# Patient Record
Sex: FEMALE | Race: WHITE | Employment: FULL TIME | URBAN - METROPOLITAN AREA
[De-identification: names, ages, dates, MRNs, and addresses within clinical notes are randomized per-mention and may not be internally consistent; named-entity substitution may affect disease eponyms.]

---

## 2017-07-12 ENCOUNTER — OFFICE VISIT (OUTPATIENT)
Dept: URGENT CARE | Facility: URGENT CARE | Age: 27
End: 2017-07-12
Payer: COMMERCIAL

## 2017-07-12 VITALS
DIASTOLIC BLOOD PRESSURE: 80 MMHG | WEIGHT: 274 LBS | BODY MASS INDEX: 43 KG/M2 | HEIGHT: 67 IN | SYSTOLIC BLOOD PRESSURE: 120 MMHG | TEMPERATURE: 99.3 F | HEART RATE: 72 BPM | OXYGEN SATURATION: 98 %

## 2017-07-12 DIAGNOSIS — M54.50 LEFT-SIDED LOW BACK PAIN WITHOUT SCIATICA, UNSPECIFIED CHRONICITY: ICD-10-CM

## 2017-07-12 DIAGNOSIS — R10.9 FLANK PAIN: Primary | ICD-10-CM

## 2017-07-12 LAB
ALBUMIN UR-MCNC: NEGATIVE MG/DL
APPEARANCE UR: ABNORMAL
BACTERIA #/AREA URNS HPF: ABNORMAL /HPF
BILIRUB UR QL STRIP: NEGATIVE
COLOR UR AUTO: YELLOW
GLUCOSE UR STRIP-MCNC: NEGATIVE MG/DL
HGB UR QL STRIP: ABNORMAL
KETONES UR STRIP-MCNC: NEGATIVE MG/DL
LEUKOCYTE ESTERASE UR QL STRIP: NEGATIVE
NITRATE UR QL: NEGATIVE
NON-SQ EPI CELLS #/AREA URNS LPF: ABNORMAL /LPF
PH UR STRIP: 5.5 PH (ref 5–7)
RBC #/AREA URNS AUTO: ABNORMAL /HPF (ref 0–2)
SP GR UR STRIP: >1.03 (ref 1–1.03)
URN SPEC COLLECT METH UR: ABNORMAL
UROBILINOGEN UR STRIP-ACNC: 0.2 EU/DL (ref 0.2–1)
WBC #/AREA URNS AUTO: ABNORMAL /HPF (ref 0–2)

## 2017-07-12 PROCEDURE — 81001 URINALYSIS AUTO W/SCOPE: CPT | Performed by: FAMILY MEDICINE

## 2017-07-12 PROCEDURE — 99213 OFFICE O/P EST LOW 20 MIN: CPT | Performed by: FAMILY MEDICINE

## 2017-07-12 PROCEDURE — 87086 URINE CULTURE/COLONY COUNT: CPT | Performed by: FAMILY MEDICINE

## 2017-07-12 NOTE — MR AVS SNAPSHOT
"              After Visit Summary   2017    Ophelia Stratton    MRN: [id number]           Patient Information     Date Of Birth          1990        Visit Information        Provider Department      2017 7:35 PM Shell Lord MD [facility]        Today's Diagnoses     Flank pain    -  1    Left-sided low back pain without sciatica, unspecified chronicity           Follow-ups after your visit        Who to contact     If you have questions or need follow up information about today's clinic visit or your schedule please contact [facility] directly at [phone].  Normal or non-critical lab and imaging results will be communicated to you by Soapbox Mobilehart, letter or phone within 4 business days after the clinic has received the results. If you do not hear from us within 7 days, please contact the clinic through Soapbox Mobilehart or phone. If you have a critical or abnormal lab result, we will notify you by phone as soon as possible.  Submit refill requests through [email or web address] or call your pharmacy and they will forward the refill request to us. Please allow 3 business days for your refill to be completed.          Additional Information About Your Visit        MyChart Information     [email or web address] lets you send messages to your doctor, view your test results, renew your prescriptions, schedule appointments and more. To sign up, go to [email or web address] . Click on \"Log in\" on the left side of the screen, which will take you to the Welcome page. Then click on \"Sign up Now\" on the right side of the page.     You will be asked to enter the access code listed below, as well as some personal information. Please follow the directions to create your username and password.     Your access code is: [email or web address]  Expires: 10/10/2017 11:34 PM     Your access code will  in 90 days. If you need help or a new code, please call your [facility] or [phone].        Care EveryWhere ID  " "   This is your Care EveryWhere ID. This could be used by other organizations to access your [facility] medical records  [id number]        Your Vitals Were     Pulse Temperature Height Pulse Oximetry BMI (Body Mass Index)       72 99.3  F (37.4  C) (Oral) 5' 7\" (1.702 m) 98% 42.91 kg/m2        Blood Pressure from Last 3 Encounters:   07/12/17 120/80   03/20/16 135/87    Weight from Last 3 Encounters:   07/12/17 274 lb (124.3 kg)   03/20/16 293 lb (132.9 kg)              We Performed the Following     UA with Microscopic reflex to Culture     Urine Culture Aerobic Bacterial        Primary Care Provider    None Specified       No primary provider on file.        Equal Access to Services     VANESSA CANALES : Cyndi Lockett, zulema mckeon, amelie beckford, pamela alexandra . So [facility] [phone].    ATENCIÓN: Si habla español, tiene a kimble disposición servicios gratuitos de asistencia lingüística. Llame al [phone].    We comply with applicable federal civil rights laws and [place] laws. We do not discriminate on the basis of race, color, national origin, age, disability sex, sexual orientation or gender identity.            Thank you!     [facility] you for [facility]  [facility]. Our goal is always to provide you with excellent care. Hearing back from our patients is one way we can continue to improve our services. Please take a few minutes to complete the written survey that you may receive in the mail after your visit with us. Thank you!             Your Updated Medication List - Protect others around you: Learn how to safely use, store and throw away your medicines at www.disposemymeds.org.          This list is accurate as of: 7/12/17 11:34 PM.  Always use your most recent med list.                   Brand Name Dispense Instructions for use Diagnosis    amphetamine-dextroamphetamine 30 MG per tablet    ADDERALL     One tablet first thing in " the morning, one 4-5 hours later.        TYLENOL PO      Take 500 mg by mouth every 8 hours as needed for mild pain or fever

## 2017-07-12 NOTE — LETTER
[facility]  [address] [place] [address]    July 14, 2017    Ophelia Stratton  [address]  [place] [address]          Dear Vanessa Jacinto is a copy of your results. The urine culture is negative, please follow up if symptoms persist.    Results for orders placed or performed in visit on 07/12/17   UA with Microscopic reflex to Culture   Result Value Ref Range    Color Urine Yellow     Appearance Urine Slightly Cloudy     Glucose Urine Negative NEG mg/dL    Bilirubin Urine Negative NEG    Ketones Urine Negative NEG mg/dL    Specific Gravity Urine >1.030 1.003 - 1.035    pH Urine 5.5 5.0 - 7.0 pH    Protein Albumin Urine Negative NEG mg/dL    Urobilinogen Urine 0.2 0.2 - 1.0 EU/dL    Nitrite Urine Negative NEG    Blood Urine Moderate (A) NEG    Leukocyte Esterase Urine Negative NEG    Source Midstream Urine     WBC Urine 2-5 (A) 0 - 2 /HPF    RBC Urine O - 2 0 - 2 /HPF    Squamous Epithelial /LPF Urine Many (A) FEW /LPF    Bacteria Urine Many (A) NEG /HPF   Urine Culture Aerobic Bacterial   Result Value Ref Range    Specimen Description Midstream Urine     Culture Micro       10,000 to 50,000 colonies/mL mixed urogenital angelo Susceptibility testing not   routinely done      Micro Report Status FINAL 07/13/2017        If you have any questions or concerns, please call myself or my nurse at [phone].      Sincerely,        hSell Lord MD/susie

## 2017-07-13 LAB
BACTERIA SPEC CULT: NORMAL
MICRO REPORT STATUS: NORMAL
SPECIMEN SOURCE: NORMAL

## 2017-07-13 NOTE — NURSING NOTE
".  Chief Complaint   Patient presents with     Back Pain     left side       Initial /80  Pulse 72  Temp 99.3  F (37.4  C) (Oral)  Ht 5' 7\" (1.702 m)  Wt 274 lb (124.3 kg)  SpO2 98%  BMI 42.91 kg/m2 Estimated body mass index is 42.91 kg/(m^2) as calculated from the following:    Height as of this encounter: 5' 7\" (1.702 m).    Weight as of this encounter: 274 lb (124.3 kg).  Medication Reconciliation: complete   Cielo Roman CMA    "

## 2017-07-13 NOTE — PROGRESS NOTES
SUBJECTIVE:                                                    Ophelia Stratton is a 27 year old female who presents to clinic today for the following health issues:      Back Pain       Duration: 3 weeks        Specific cause: no injury    Description:   Location of pain: low back left  Character of pain: sharp and constant  Pain radiation:goes to side  New numbness or weakness in legs, not attributed to pain:  no     Intensity: Currently 6/10    History:   Pain interferes with job: YES,   History of back problems: when pregnant  Denies any possibility of pregnancy declined a pregnancy test  Any previous MRI or X-rays: Yes--at [place].  Date   Sees a specialist for back pain:  No  Therapies tried without relief: NSAIDs     Alleviating factors:   Improved by: none      Precipitating factors:  Worsened by: bending     Functional and Psychosocial Screen (Ba STarT Back):        Left sided back pain    It's been three weeks    Woke up with it one day was sore  Wasn't sure if it was just something she did at work.  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  But no injuries    Continued to be the same   Seems to be worse when she wakes in the morning and also at night would be pretty bad  Constant during the day feels like a bruise throughout the day.    Just taking tylenol and occasional ibuprofen    Warm baths seem to help    History of trauma: none     No thoughts of harming self or others     Problem list, Medication list, Allergies, and Medical/Social/Surgical histories reviewed in EPIC and updated as appropriate.        REVIEW OF SYSTEMS  General: negative for fever, constitutional symptoms or weight loss  Resp: negative for chest pain or shortness of breath  CV: negative for chest pain  : negative for dysuria , incontinence, frequency  Musculoskeletal: as above  Neurologic: negative for ataxia, saddle anesthesia, fecal or urinary incontinence, one sided weakness,  Paresthesias  Constitutional,  "HEENT, cardiovascular, pulmonary, gi and gu systems are negative, except as otherwise noted.    Physical Exam:  Vitals: /80  Pulse 72  Temp 99.3  F (37.4  C) (Oral)  Ht 5' 7\" (1.702 m)  Wt 274 lb (124.3 kg)  SpO2 98%  BMI 42.91 kg/m2  BMI= Body mass index is 42.91 kg/(m^2).  Constitutional: healthy, alert and no acute distress   Head: atraumatic  CARDIO: regular in rate and rhythm no murmurs rubs or gallops  RESP: lungs clear to auscultation  ABDOMEN: soft nontender  NEURO: Patellar reflexes intact and equal b/l  BACK:  Straight leg raise intact, No spine tenderness  Positive left lumbar  paraspinal muscle tenderness to palpation, strength intact and equal b/l lower extremities. Sensory intact. Rectal exam declined/deferred.   GAIT: intact  Psychiatric: mentation appears normal and affect normal/bright    Diagnostic Test Results:  Results for orders placed or performed in visit on 07/12/17 (from the past 24 hour(s))   UA with Microscopic reflex to Culture   Result Value Ref Range    Color Urine Yellow     Appearance Urine Slightly Cloudy     Glucose Urine Negative NEG mg/dL    Bilirubin Urine Negative NEG    Ketones Urine Negative NEG mg/dL    Specific Gravity Urine >1.030 1.003 - 1.035    pH Urine 5.5 5.0 - 7.0 pH    Protein Albumin Urine Negative NEG mg/dL    Urobilinogen Urine 0.2 0.2 - 1.0 EU/dL    Nitrite Urine Negative NEG    Blood Urine Moderate (A) NEG    Leukocyte Esterase Urine Negative NEG    Source Midstream Urine     WBC Urine 2-5 (A) 0 - 2 /HPF    RBC Urine O - 2 0 - 2 /HPF    Squamous Epithelial /LPF Urine Many (A) FEW /LPF    Bacteria Urine Many (A) NEG /HPF         Impression:  No diagnosis found.      ICD-10-CM    1. Flank pain R10.9 UA with Microscopic reflex to Culture     Urine Culture Aerobic Bacterial   2. Left-sided low back pain without sciatica, unspecified chronicity M54.5        Plan:  Patient was mainly worried about an infection. Urinalysis is negative and appears normal " however will send for culture if positive will treat with ciprofloxacin. Potential Side effects discussed. Aware of the risks of increased tendon rupture with fluoroquinolones, especially if used with steroids.  Back pain based on exam and history appears consistent with musculoskeletal pain. Patient has had history of these flare ups in the past. Knows of some stretches she can do  Recommend primary care provider follow up if symptoms persist  Alarm signs or symptoms discussed, if present recommend go to ER   Instructions for back care and return precautions discussed.    back pain stretching excercises discussed. supportive treatment.  considery physical therapy if not better despite supportive treatment.  activity modifications advised.  Over the counter medications discussed. Patient aware to avoid NSAIDS if with any kidney disease or ulcers. Proper dosing of over the counter medications likewise discussed.  Adverse reaction to medication discussed.  aware to come in right away if with any fever or chills, worsening symptoms, headache, bowel or bladder incontinence, motor or sensory deficits or gait disturbances.   follow-up recommended.      Shell Lord MD

## 2018-02-22 ENCOUNTER — TELEPHONE (OUTPATIENT)
Dept: FAMILY MEDICINE | Facility: CLINIC | Age: 28
End: 2018-02-22

## 2018-02-22 NOTE — TELEPHONE ENCOUNTER
Patient is calling, not an established patient yet and would like to come in see provider for ADHD. Please call to discuss. Thank you.

## 2018-02-23 NOTE — TELEPHONE ENCOUNTER
Left message to call me back directly. If patient has had testing done will need to bring those with her to the appointment. If no testing has been done patient will need to have that done at [facility] or somewhere else./Denisse Owens,

## 2018-02-26 NOTE — TELEPHONE ENCOUNTER
Spoke with patient advised ADHD records needed to be treated by provider. Told to fill out an NEAL to have records transferred.   BIJAN Lynne

## 2019-10-01 ENCOUNTER — TELEPHONE (OUTPATIENT)
Dept: PSYCHOLOGY | Facility: CLINIC | Age: 29
End: 2019-10-01

## 2019-10-02 NOTE — TELEPHONE ENCOUNTER
She wants to establish care with a dr at [facility], she has been out of adderall for 2 days, her old clinic does not take [facility]are. Please call her back.

## 2019-10-03 NOTE — TELEPHONE ENCOUNTER
I spoke to the patient and I scheduled an appointment for her on 10/11/19.  She will bring her ADHD records to her visit or possibly fax them to us before her appointment.  Caroline Solis,

## 2019-10-29 NOTE — PROGRESS NOTES
"Subjective     Ophelia Stratton is a 29 year old female who presents to clinic today for the following health issues:    HPI   New Patient/Transfer of Care  ADHD  No changes since last ADHD check   [facility] pap [facility]     Pt new to [facility]. Was previously at [facility]  Off of adderral for a month and finding it difficult as she is back to school at this time  She has had no trouble with it.  No weight loss, BP good and no tics  She is agreeable to signing controlled substance agreement, she has done that in the past    Pt due for pap, she will schedule a CPE in the next few months.     Reviewed and updated as needed this visit by Provider         Review of Systems   ROS COMP: Constitutional, HEENT, cardiovascular, pulmonary, gi and gu systems are negative, except as otherwise noted.      Objective    /86   Pulse 97   Temp 98.8  F (37.1  C) (Oral)   Resp 18   Ht 1.715 m (5' 7.5\")   Wt 122.9 kg (271 lb)   LMP 10/09/2019 (Exact Date)   BMI 41.82 kg/m    Body mass index is 41.82 kg/m .  Physical Exam   GENERAL: healthy, alert and no distress  RESP: lungs clear to auscultation - no rales, rhonchi or wheezes  CV: regular rate and rhythm, normal S1 S2, no S3 or S4, no murmur, click or rub, no peripheral edema and peripheral pulses strong  MS: no gross musculoskeletal defects noted, no edema    Diagnostic Test Results:  Labs reviewed in Epic        Assessment & Plan     1. Attention deficit hyperactivity disorder (ADHD), unspecified ADHD type  Follow-up in 6 months, sooner with concern, controlled substance agreement signed  - amphetamine-dextroamphetamine (ADDERALL) 30 MG tablet; Take 1 tablet (30 mg) by mouth 2 times daily One tablet first thing in the morning, one 4-5 hours later.  Dispense: 60 tablet; Refill: 0     Tobacco Cessation:   reports that she has been smoking. She has been smoking about 0.00 packs per day. She has never used smokeless tobacco.  Tobacco Cessation " "Action Plan: Information offered: Patient not interested at this time      BMI:   Estimated body mass index is 41.82 kg/m  as calculated from the following:    Height as of this encounter: 1.715 m (5' 7.5\").    Weight as of this encounter: 122.9 kg (271 lb).   Weight management plan: Discussed healthy diet and exercise guidelines            No follow-ups on file.    Katharine Cali MD  [facility]    "

## 2019-10-30 ENCOUNTER — OFFICE VISIT (OUTPATIENT)
Dept: FAMILY MEDICINE | Facility: CLINIC | Age: 29
End: 2019-10-30
Payer: COMMERCIAL

## 2019-10-30 VITALS
RESPIRATION RATE: 18 BRPM | BODY MASS INDEX: 41.07 KG/M2 | HEIGHT: 68 IN | DIASTOLIC BLOOD PRESSURE: 86 MMHG | HEART RATE: 97 BPM | SYSTOLIC BLOOD PRESSURE: 138 MMHG | TEMPERATURE: 98.8 F | WEIGHT: 271 LBS

## 2019-10-30 DIAGNOSIS — F90.9 ATTENTION DEFICIT HYPERACTIVITY DISORDER (ADHD), UNSPECIFIED ADHD TYPE: Primary | ICD-10-CM

## 2019-10-30 DIAGNOSIS — E66.01 MORBID OBESITY (H): ICD-10-CM

## 2019-10-30 PROCEDURE — 99203 OFFICE O/P NEW LOW 30 MIN: CPT | Performed by: FAMILY MEDICINE

## 2019-10-30 RX ORDER — DEXTROAMPHETAMINE SACCHARATE, AMPHETAMINE ASPARTATE, DEXTROAMPHETAMINE SULFATE AND AMPHETAMINE SULFATE 7.5; 7.5; 7.5; 7.5 MG/1; MG/1; MG/1; MG/1
30 TABLET ORAL 2 TIMES DAILY
Qty: 60 TABLET | Refills: 0 | Status: SHIPPED | OUTPATIENT
Start: 2019-10-30 | End: 2019-11-25

## 2019-10-30 ASSESSMENT — MIFFLIN-ST. JEOR: SCORE: 1999.81

## 2019-10-30 NOTE — LETTER
[facility]  10/30/19    Patient: Ophelia Stratton  YOB: 1990  Medical Record Number: [id number]  CSN: [id number]                                                                              Non-opioid Controlled Substance Agreement    I understand that my care provider has prescribed a controlled substance to help manage my condition(s). I am taking this medicine to help me function or work. I know this is strong medicine, and that it can cause serious side effects. Controlled substances can be sedating, addicting and may cause a dependency on the drug. They can affect my ability to drive or think, and cause depression. They need to be taken exactly as prescribed. Combining controlled substances with certain medicines or chemicals (such as cocaine, sedatives and tranquilizers, sleeping pills, meth) can be dangerous or even fatal. Also, if I stop controlled substances suddenly, I may have severe withdrawal symptoms.  If not helpful, I may be asked to stop them.    The risks, benefits, and side effects of these medicine(s) were explained to me. I agree that:    1. I will take part in other treatments as advised by my care team. This may be psychiatry or counseling, physical therapy, behavioral therapy, group treatment or a referral to a pain clinic. I will reduce or stop my medicine when my care team tells me to do so.  2. I will take my medicines as prescribed. I will not change the dose or schedule unless my care team tells me to. There will be no refills if I  run out early.   I may be contactedwithout warning and asked to complete a urine drug test or pill count at any time.   3. I will keep all my appointments, and understand this is part of the monitoring of controlled substances. My care team may require an office visit for EVERY controlled substance refill. If I miss appointments or don t follow instructions, my care team may stop my medicine.  4. I will not ask other providers to  prescribe controlled substances, and I will not accept controlled substances from other people. If I need another prescribed controlled substance for a new reason, I will tell my care team within 1 business day.  5. I will use one pharmacy to fill all of my controlled substance prescriptions, and it is up to me to make sure that I do not run out of my medicines on weekends or holidays. If my care team is willing to refill my controlled substance prescription without a visit, I must request refills only during office hours, refills may take up to 3 days to process, and it may take up to 5 to 7 days for my medicine to be mailed and ready at my pharmacy. Prescriptions will not be mailed anywhere except my pharmacy.    6. I am responsible for my prescriptions. If the medicine/prescription is lost or stolen, it will not be replaced. I also agree not to share controlled substance medicines with anyone.              [facility]  10/30/19  Patient:  Ophelia Stratton  YOB: 1990  Medical Record Number: [id number]  [facility]: [id number]    7. I agree to not use ANY illegal or recreational drugs. This includes marijuana, cocaine, bath salts or other drugs. I agree not to use alcohol unless my care team says I may. I agree to give urine samples whenever asked. If I don t give a urine sample, the care team may stop my medicine.    8. If I enroll in the [place] Medical Marijuana program, I will tell my care team. I will also sign an agreement to share my medical records with my care team.    9. I will bring in my list of medicines (or my medicine bottles) each time I come to the clinic.   10. I will tell my care team right away if I become pregnant or have a new medical problem treated outside of my regular clinic.  11. I understand that this medicine can affect my thinking and judgment. It may be unsafe for me to drive, use machinery and do dangerous tasks. I will not do any of these things until I know how  the medicine affects me. If my dose changes, I will wait to see how it affects me. I will contact my care team if I have concerns about medicine side effects.    I understand that if I do not follow any of the conditions above, my prescriptions or treatment may be stopped.      I agree that my provider, clinic care team, and pharmacy may work with any city, state or federal law enforcement agency that investigates the misuse, sale, or other diversion of my controlled medicine. I will allow my provider to discuss my care with or share a copy of this agreement with any other treating provider, pharmacy or emergency room where I receive care. I agree to give up (waive) any right of privacy or confidentiality with respect to these consents.   I have read this agreement and have asked questions about anything I did not understand.    ____________________________________________________    ____________  ________  Patient signature                                                         Date      Time    ____________________________________________________     ____________  ________  Witness                                                          Date      Time    ____________________________________________________  Provider signature

## 2019-10-30 NOTE — NURSING NOTE
"Chief Complaint   Patient presents with     A.D.H.D       Initial /86   Pulse 97   Temp 98.8  F (37.1  C) (Oral)   Resp 18   Ht 1.715 m (5' 7.5\")   Wt 122.9 kg (271 lb)   BMI 41.82 kg/m   Estimated body mass index is 41.82 kg/m  as calculated from the following:    Height as of this encounter: 1.715 m (5' 7.5\").    Weight as of this encounter: 122.9 kg (271 lb).    Cielo Roman CMA    "

## 2019-11-25 DIAGNOSIS — F90.9 ATTENTION DEFICIT HYPERACTIVITY DISORDER (ADHD), UNSPECIFIED ADHD TYPE: ICD-10-CM

## 2019-11-25 RX ORDER — DEXTROAMPHETAMINE SACCHARATE, AMPHETAMINE ASPARTATE, DEXTROAMPHETAMINE SULFATE AND AMPHETAMINE SULFATE 7.5; 7.5; 7.5; 7.5 MG/1; MG/1; MG/1; MG/1
30 TABLET ORAL 2 TIMES DAILY
Qty: 60 TABLET | Refills: 0 | Status: SHIPPED | OUTPATIENT
Start: 2019-11-25 | End: 2019-12-19

## 2019-11-25 NOTE — TELEPHONE ENCOUNTER
Reason for call:  Medication   If this is a refill request, has the caller requested the refill from the pharmacy already? N/A  Will the patient be using a [facility]? N/A  Name of the pharmacy and phone number for the current request: on file    Name of the medication requested: adderall    Other request: Would like it refill asap!    Phone number to reach patient:  Cell number on file:    Telephone Information:   Mobile [phone]       Best Time:  any    Can we leave a detailed message on this number?  YES

## 2019-11-25 NOTE — TELEPHONE ENCOUNTER
Controlled Substance Refill Request for adderall 30mg bid  Last refill: 10/30/19 x1 month  OV note from 10/30 said follow up in 6 months.  Problem List Complete:  Yes  Overview Signed 10/30/2019  3:53 PM by Katharine Portillo MD   Patient is followed by KATHARINE PORTILLO for ongoing prescription of stimulants.  All refills should be approved by this provider, or covering partner.     Medication(s): adderall 30 bid.   Maximum quantity per month: 60  Clinic visit frequency required: Q 6  months      Controlled substance agreement on file: Yes       Date(s): 10/30/19  Neuropsych evaluation for ADD completed:  No           checked in past 3 months?  RN unable to access  at this time.  Rosibel Parra RN

## 2019-11-27 NOTE — TELEPHONE ENCOUNTER
I spoke to the patient and let her know that her refill was sent electronically to the pharmacy.  Caroline Solis,

## 2019-12-18 DIAGNOSIS — F90.9 ATTENTION DEFICIT HYPERACTIVITY DISORDER (ADHD), UNSPECIFIED ADHD TYPE: ICD-10-CM

## 2019-12-19 RX ORDER — DEXTROAMPHETAMINE SACCHARATE, AMPHETAMINE ASPARTATE, DEXTROAMPHETAMINE SULFATE AND AMPHETAMINE SULFATE 7.5; 7.5; 7.5; 7.5 MG/1; MG/1; MG/1; MG/1
30 TABLET ORAL 2 TIMES DAILY
Qty: 60 TABLET | Refills: 0 | Status: SHIPPED | OUTPATIENT
Start: 2019-12-19 | End: 2020-01-20

## 2019-12-19 NOTE — TELEPHONE ENCOUNTER
Reason for call:  Medication   If this is a refill request, has the caller requested the refill from the pharmacy already? N/A  Will the patient be using a [facility]? N/A  Name of the pharmacy and phone number for the current request: Adderall refill    Name of the medication requested: Adderall refill    Other request: wanted to call ahead to rigoberto a refill     Phone number to reach patient:  Cell number on file:    Telephone Information:   Mobile [phone]       Best Time:  Any      Can we leave a detailed message on this number?  YES

## 2019-12-19 NOTE — TELEPHONE ENCOUNTER
Controlled Substance Refill Request for Adderall 30mg  Problem List Complete:  Yes    Patient is followed by SHAYY BALTAZAR for ongoing prescription of stimulants.  All refills should be approved by this provider, or covering partner.     Medication(s): adderall 30 bid.   Maximum quantity per month: 60  Clinic visit frequency required: Q 6  months      Controlled substance agreement on file: Yes       Date(s): 10/30/19  Neuropsych evaluation for ADD completed:  No       checked in past 3 months?  Yes 12/18/19, last filled 11/26/19       Shireen SOLN, RN, CPN

## 2020-01-20 ENCOUNTER — TELEPHONE (OUTPATIENT)
Dept: FAMILY MEDICINE | Facility: CLINIC | Age: 30
End: 2020-01-20

## 2020-01-20 DIAGNOSIS — F90.9 ATTENTION DEFICIT HYPERACTIVITY DISORDER (ADHD), UNSPECIFIED ADHD TYPE: ICD-10-CM

## 2020-01-20 RX ORDER — DEXTROAMPHETAMINE SACCHARATE, AMPHETAMINE ASPARTATE, DEXTROAMPHETAMINE SULFATE AND AMPHETAMINE SULFATE 7.5; 7.5; 7.5; 7.5 MG/1; MG/1; MG/1; MG/1
30 TABLET ORAL 2 TIMES DAILY
Qty: 60 TABLET | Refills: 0 | Status: SHIPPED | OUTPATIENT
Start: 2020-01-20 | End: 2020-02-21

## 2020-01-20 NOTE — TELEPHONE ENCOUNTER
Reason for call:  Other   Patient called regarding (reason for call): call back  Additional comments: an reminder for prescription refill for adderall.    Phone number to reach patient:  Cell number on file:    Telephone Information:   Mobile [phone]       Best Time:  anytime    Can we leave a detailed message on this number?  YES

## 2020-01-20 NOTE — TELEPHONE ENCOUNTER
Controlled Substance Refill Request for Adderall 30mg  Problem List Complete:  Yes     Patient is followed by SHAYY BALTAZAR for ongoing prescription of stimulants.  All refills should be approved by this provider, or covering partner.     Medication(s): adderall 30 bid.   Maximum quantity per month: 60  Clinic visit frequency required: Q 6  months      Controlled substance agreement on file: Yes       Date(s): 10/30/19  Neuropsych evaluation for ADD completed:  No         checked in past 3 months?  Yes 12/18/19    Asiya SOLN, RN

## 2020-02-20 ENCOUNTER — TELEPHONE (OUTPATIENT)
Dept: FAMILY MEDICINE | Facility: CLINIC | Age: 30
End: 2020-02-20

## 2020-02-20 DIAGNOSIS — F90.9 ATTENTION DEFICIT HYPERACTIVITY DISORDER (ADHD), UNSPECIFIED ADHD TYPE: ICD-10-CM

## 2020-02-20 NOTE — TELEPHONE ENCOUNTER
Reason for call:  Medication   If this is a refill request, has the caller requested the refill from the pharmacy already? No  Will the patient be using a [facility]? No  Name of the pharmacy and phone number for the current request: [facility] [phone]    Name of the medication requested: amphetamine-dextroamphetamine (ADDERALL) 30 MG tablet    Other request: N/A    Phone number to reach patient:  Cell number on file:    Telephone Information:   Mobile [phone]       Best Time:  Anytime    Can we leave a detailed message on this number?  YES    Travel screening: Not Applicable

## 2020-02-21 RX ORDER — DEXTROAMPHETAMINE SACCHARATE, AMPHETAMINE ASPARTATE, DEXTROAMPHETAMINE SULFATE AND AMPHETAMINE SULFATE 7.5; 7.5; 7.5; 7.5 MG/1; MG/1; MG/1; MG/1
30 TABLET ORAL 2 TIMES DAILY
Qty: 60 TABLET | Refills: 0 | Status: SHIPPED | OUTPATIENT
Start: 2020-02-21 | End: 2020-03-20

## 2020-03-20 ENCOUNTER — TELEPHONE (OUTPATIENT)
Dept: FAMILY MEDICINE | Facility: CLINIC | Age: 30
End: 2020-03-20

## 2020-03-20 DIAGNOSIS — F90.9 ATTENTION DEFICIT HYPERACTIVITY DISORDER (ADHD), UNSPECIFIED ADHD TYPE: ICD-10-CM

## 2020-03-20 RX ORDER — DEXTROAMPHETAMINE SACCHARATE, AMPHETAMINE ASPARTATE, DEXTROAMPHETAMINE SULFATE AND AMPHETAMINE SULFATE 7.5; 7.5; 7.5; 7.5 MG/1; MG/1; MG/1; MG/1
30 TABLET ORAL 2 TIMES DAILY
Qty: 60 TABLET | Refills: 0 | Status: SHIPPED | OUTPATIENT
Start: 2020-03-20 | End: 2020-04-17

## 2020-03-20 NOTE — TELEPHONE ENCOUNTER
Controlled Substance Refill Request for Adderall  Problem List Complete:  Yes    Patient is followed by SHAYY BALTAZAR for ongoing prescription of stimulants.  All refills should be approved by this provider, or covering partner.     Medication(s): adderall 30 bid.   Maximum quantity per month: 60  Clinic visit frequency required: Q 6  months      Controlled substance agreement on file: Yes       Date(s): 10/30/19  Neuropsych evaluation for ADD completed:  No       checked in past 3 months?  Yes 3/20/20, unable to retrieve date from        Shireen SOLN, RN, CPN

## 2020-03-20 NOTE — TELEPHONE ENCOUNTER
Reason for call:  Per patient: Needs a refill on adderall medication    Phone number to reach patient:  Cell number on file:    Telephone Information:   Mobile [phone]       Best Time:  Anytime    Can we leave a detailed message on this number?  YES    Travel screening: Negative

## 2020-04-17 ENCOUNTER — TELEPHONE (OUTPATIENT)
Dept: FAMILY MEDICINE | Facility: CLINIC | Age: 30
End: 2020-04-17

## 2020-04-17 DIAGNOSIS — F90.9 ATTENTION DEFICIT HYPERACTIVITY DISORDER (ADHD), UNSPECIFIED ADHD TYPE: ICD-10-CM

## 2020-04-17 RX ORDER — DEXTROAMPHETAMINE SACCHARATE, AMPHETAMINE ASPARTATE, DEXTROAMPHETAMINE SULFATE AND AMPHETAMINE SULFATE 7.5; 7.5; 7.5; 7.5 MG/1; MG/1; MG/1; MG/1
30 TABLET ORAL 2 TIMES DAILY
Qty: 60 TABLET | Refills: 0 | Status: SHIPPED | OUTPATIENT
Start: 2020-04-17 | End: 2020-05-13

## 2020-04-17 NOTE — TELEPHONE ENCOUNTER
Reason for call:  Medication   If this is a refill request, has the caller requested the refill from the pharmacy already? Yes  Will the patient be using a [facility]? No  Name of the pharmacy and phone number for the current request: [facility]/pharmacy #[id number] - [place]  [phone] (Phone  [phone] (Fax)    Name of the medication requested: ASSERALL 30 mg    Other request: patient is leaving the state and would like the refill as soon as possible     Phone number to reach patient:  Cell number on file:    Telephone Information:   Mobile [phone]       Best Time:  anytime    Can we leave a detailed message on this number?  YES    Travel screening: Not Applicable

## 2020-04-17 NOTE — TELEPHONE ENCOUNTER
Controlled Substance Refill Request for Adderall  Problem List Complete:  Yes     Patient is followed by SHAYY BALTAZAR for ongoing prescription of stimulants.  All refills should be approved by this provider, or covering partner.     Medication(s): adderall 30 bid.   Maximum quantity per month: 60  Clinic visit frequency required: Q 6  months      Controlled substance agreement on file: Yes       Date(s): 10/30/19  Neuropsych evaluation for ADD completed:  No         checked in past 3 months?  Yes 3/20/20,  Cielo SOLN, RN

## 2020-05-12 ENCOUNTER — TELEPHONE (OUTPATIENT)
Dept: FAMILY MEDICINE | Facility: CLINIC | Age: 30
End: 2020-05-12

## 2020-05-12 DIAGNOSIS — F90.9 ATTENTION DEFICIT HYPERACTIVITY DISORDER (ADHD), UNSPECIFIED ADHD TYPE: ICD-10-CM

## 2020-05-12 RX ORDER — DEXTROAMPHETAMINE SACCHARATE, AMPHETAMINE ASPARTATE, DEXTROAMPHETAMINE SULFATE AND AMPHETAMINE SULFATE 7.5; 7.5; 7.5; 7.5 MG/1; MG/1; MG/1; MG/1
30 TABLET ORAL 2 TIMES DAILY
Qty: 60 TABLET | Refills: 0 | Status: CANCELLED | OUTPATIENT
Start: 2020-05-12

## 2020-05-12 NOTE — TELEPHONE ENCOUNTER
Controlled Substance Refill Request for Adderall  Problem List Complete:  Yes     Patient is followed by SHAYY BALTAZAR for ongoing prescription of stimulants.  All refills should be approved by this provider, or covering partner.     Medication(s): adderall 30 bid.   Maximum quantity per month: 60  Clinic visit frequency required: Q 6  months      Controlled substance agreement on file: Yes       Date(s): 10/30/19  Neuropsych evaluation for ADD completed:  No       checked in past 3 months?  Yes 3/20/20    Asiya SOLN, RN

## 2020-05-12 NOTE — TELEPHONE ENCOUNTER
Reason for Call:  Medication or medication refill:    Do you use a [facility]?  Name of the pharmacy and phone number for the current request:  [facility]     Name of the medication requested: amphetamine-dextroamphetamine (ADDERALL) 30    Other [facility] [place]    Can we leave a detailed message on this number? YES    Phone number patient can be reached at: Cell number on file:    Telephone Information:   Mobile [phone]       Best Time: anytime      Call taken on 5/12/2020 at 11:19 AM by Peg Frazier

## 2020-05-13 ENCOUNTER — VIRTUAL VISIT (OUTPATIENT)
Dept: FAMILY MEDICINE | Facility: CLINIC | Age: 30
End: 2020-05-13
Payer: MEDICAID

## 2020-05-13 DIAGNOSIS — F90.9 ATTENTION DEFICIT HYPERACTIVITY DISORDER (ADHD), UNSPECIFIED ADHD TYPE: ICD-10-CM

## 2020-05-13 PROCEDURE — 99213 OFFICE O/P EST LOW 20 MIN: CPT | Mod: GT | Performed by: FAMILY MEDICINE

## 2020-05-13 RX ORDER — DEXTROAMPHETAMINE SACCHARATE, AMPHETAMINE ASPARTATE, DEXTROAMPHETAMINE SULFATE AND AMPHETAMINE SULFATE 7.5; 7.5; 7.5; 7.5 MG/1; MG/1; MG/1; MG/1
30 TABLET ORAL 2 TIMES DAILY
Qty: 60 TABLET | Refills: 0 | Status: SHIPPED | OUTPATIENT
Start: 2020-05-13 | End: 2020-06-09

## 2020-05-13 NOTE — PROGRESS NOTES
"Ophelia Stratton is a 30 year old female who is being evaluated via a billable video visit.      The patient has been notified of following:     \"This video visit will be conducted via a call between you and your physician/provider. We have found that certain health care needs can be provided without the need for an in-person physical exam.  This service lets us provide the care you need with a video conversation.  If a prescription is necessary we can send it directly to your pharmacy.  If lab work is needed we can place an order for that and you can then stop by our lab to have the test done at a later time.    Video visits are billed at different rates depending on your insurance coverage.  Please reach out to your insurance provider with any questions.    If during the course of the call the physician/provider feels a video visit is not appropriate, you will not be charged for this service.\"    Patient has given verbal consent for Video visit? Yes    How would you like to obtain your AVS? decline    Patient would like the video invitation sent by: Text to cell phone: [phone]    Will anyone else be joining your video visit? No      Subjective     Ophelia Stratton is a 30 year old female who presents today via video visit for the following health issues:    HPI  Medication Followup of adderal    Taking Medication as prescribed: yes    Side Effects:  None    Medication Helping Symptoms:  yes          Video Start Time: 4:44 PM      Pt with ADHD on adderall 30 mg  Has been on this for months takes it most days. Occasionally not on weekends  It is working well  Weight has been stable  BP okay at last visit   no tics  Pt without concerns        Reviewed and updated as needed this visit by Provider         Review of Systems   Constitutional, HEENT, cardiovascular, pulmonary, gi and gu systems are negative, except as otherwise noted.      Objective    There were no vitals taken for this visit.  Estimated body " "mass index is 41.82 kg/m  as calculated from the following:    Height as of 10/30/19: 1.715 m (5' 7.5\").    Weight as of 10/30/19: 122.9 kg (271 lb).  Physical Exam     GENERAL: Healthy, alert and no distress  EYES: Eyes grossly normal to inspection.  No discharge or erythema, or obvious scleral/conjunctival abnormalities.  RESP: No audible wheeze, cough, or visible cyanosis.  No visible retractions or increased work of breathing.    SKIN: Visible skin clear. No significant rash, abnormal pigmentation or lesions.  NEURO: Cranial nerves grossly intact.  Mentation and speech appropriate for age.  PSYCH: Mentation appears normal, affect normal/bright, judgement and insight intact, normal speech and appearance well-groomed.      Diagnostic Test Results:  Labs reviewed in Epic        Assessment & Plan     (F90.9) Attention deficit hyperactivity disorder (ADHD), unspecified ADHD type  Comment:   Plan: amphetamine-dextroamphetamine (ADDERALL) 30 MG         tablet        Follow-up in 6 months, sooner with concerns         BMI:   Estimated body mass index is 41.82 kg/m  as calculated from the following:    Height as of 10/30/19: 1.715 m (5' 7.5\").    Weight as of 10/30/19: 122.9 kg (271 lb).   Weight management plan: Discussed healthy diet and exercise guidelines            No follow-ups on file.    Katharine Cali MD  [facility]      Video-Visit Details    Type of service:  Video Visit    Video End Time:4:49 PM    Originating Location (pt. Location): Home    Distant Location (provider location):  [facility]     Platform used for Video Visit: Doximity    No follow-ups on file.       Katharine Cali MD        "

## 2020-06-09 ENCOUNTER — TELEPHONE (OUTPATIENT)
Dept: FAMILY MEDICINE | Facility: CLINIC | Age: 30
End: 2020-06-09

## 2020-06-09 DIAGNOSIS — F90.9 ATTENTION DEFICIT HYPERACTIVITY DISORDER (ADHD), UNSPECIFIED ADHD TYPE: ICD-10-CM

## 2020-06-09 RX ORDER — DEXTROAMPHETAMINE SACCHARATE, AMPHETAMINE ASPARTATE, DEXTROAMPHETAMINE SULFATE AND AMPHETAMINE SULFATE 7.5; 7.5; 7.5; 7.5 MG/1; MG/1; MG/1; MG/1
30 TABLET ORAL 2 TIMES DAILY
Qty: 60 TABLET | Refills: 0 | Status: SHIPPED | OUTPATIENT
Start: 2020-06-09 | End: 2020-07-06

## 2020-06-09 NOTE — TELEPHONE ENCOUNTER
Controlled Substance Refill Request for Adderall  Problem List Complete:  Yes     Patient is followed by SHAYY BALTAZAR for ongoing prescription of stimulants.  All refills should be approved by this provider, or covering partner.     Medication(s): adderall 30 bid.   Maximum quantity per month: 60  Clinic visit frequency required: Q 6  months      Controlled substance agreement on file: Yes       Date(s): 10/30/19  Neuropsych evaluation for ADD completed:  No      checked in past 3 months?  Yes 6/9/20    Asiya SOLN, RN

## 2020-07-06 ENCOUNTER — TELEPHONE (OUTPATIENT)
Dept: FAMILY MEDICINE | Facility: CLINIC | Age: 30
End: 2020-07-06

## 2020-07-06 DIAGNOSIS — F90.9 ATTENTION DEFICIT HYPERACTIVITY DISORDER (ADHD), UNSPECIFIED ADHD TYPE: ICD-10-CM

## 2020-07-06 RX ORDER — DEXTROAMPHETAMINE SACCHARATE, AMPHETAMINE ASPARTATE, DEXTROAMPHETAMINE SULFATE AND AMPHETAMINE SULFATE 7.5; 7.5; 7.5; 7.5 MG/1; MG/1; MG/1; MG/1
30 TABLET ORAL 2 TIMES DAILY
Qty: 60 TABLET | Refills: 0 | Status: SHIPPED | OUTPATIENT
Start: 2020-07-06 | End: 2020-08-03

## 2020-07-06 NOTE — TELEPHONE ENCOUNTER
Patient calling is running very low on her adderall will be out soon. Would like script sent to pharmacy.

## 2020-07-06 NOTE — TELEPHONE ENCOUNTER
Controlled Substance Refill Request for Adderall  Problem List Complete:  Yes    Last refill 6/9/20  Last office visit 5/13/20, virtual visit.      Patient is followed by SHAYY BALTAZAR for ongoing prescription of stimulants.  All refills should be approved by this provider, or covering partner.     Medication(s): adderall 30 bid.   Maximum quantity per month: 60  Clinic visit frequency required: Q 6  months      Controlled substance agreement on file: Yes       Date(s): 10/30/19  Neuropsych evaluation for ADD completed:  No      checked in past 3 months?  Yes 6/9/20  Angela Molina RN

## 2020-08-03 ENCOUNTER — TELEPHONE (OUTPATIENT)
Dept: FAMILY MEDICINE | Facility: CLINIC | Age: 30
End: 2020-08-03

## 2020-08-03 DIAGNOSIS — F90.9 ATTENTION DEFICIT HYPERACTIVITY DISORDER (ADHD), UNSPECIFIED ADHD TYPE: ICD-10-CM

## 2020-08-03 RX ORDER — DEXTROAMPHETAMINE SACCHARATE, AMPHETAMINE ASPARTATE, DEXTROAMPHETAMINE SULFATE AND AMPHETAMINE SULFATE 7.5; 7.5; 7.5; 7.5 MG/1; MG/1; MG/1; MG/1
30 TABLET ORAL 2 TIMES DAILY
Qty: 60 TABLET | Refills: 0 | Status: SHIPPED | OUTPATIENT
Start: 2020-08-03 | End: 2020-08-29

## 2020-08-03 NOTE — TELEPHONE ENCOUNTER
Controlled Substance Refill Request for Adderall  Problem List Complete:  Yes    Last Written Prescription Date:  7/6/20  Last Fill Quantity: 60,   # refills: 0    Last Office Visit with [facility] primary care provider: 5/13/20      Controlled substance agreement:   Encounter-Level CSA:    There are no encounter-level csa.     Patient-Level CSA:    Controlled Substance Agreement - Non - Opioid - Scan on 11/5/2019  5:52 PM: NON-OPIOID CONTROLLED SUBSTANCE AGREEMENT         Last Urine Drug Screen: No results found for: CDAUT, No results found for: COMDAT, No results found for: THC13, PCP13, COC13, MAMP13, OPI13, AMP13, BZO13, TCA13, MTD13, BAR13, OXY13, PPX13, BUP13         [email or web address]   checked in past 3 months?  Yes 8/3/20, no concerns       Shireen SOLN, RN, CPN

## 2020-08-03 NOTE — TELEPHONE ENCOUNTER
Reason for call:  Medication   If this is a refill request, has the caller requested the refill from the pharmacy already? No  Will the patient be using a [facility]? No  Name of the pharmacy and phone number for the current request: [facility] [phone]      Name of the medication requested: Adderall    Other request: na    Phone number to reach patient:  Cell number on file:    Telephone Information:   Mobile [phone]       Best Time: na    Can we leave a detailed message on this number?  YES    Travel screening: Negative

## 2020-08-28 ENCOUNTER — TELEPHONE (OUTPATIENT)
Dept: FAMILY MEDICINE | Facility: CLINIC | Age: 30
End: 2020-08-28

## 2020-08-28 DIAGNOSIS — F90.9 ATTENTION DEFICIT HYPERACTIVITY DISORDER (ADHD), UNSPECIFIED ADHD TYPE: ICD-10-CM

## 2020-08-28 NOTE — TELEPHONE ENCOUNTER
Controlled Substance Refill Request for Adderall  Problem List Complete:  Yes     Last Written Prescription Date:  8/3/20  Last Fill Quantity: 60,   # refills: 0     Last Office Visit with [facility] primary care provider: 5/13/20     Controlled substance agreement:   Encounter-Level CSA:    There are no encounter-level csa.      Patient-Level CSA:    Controlled Substance Agreement - Non - Opioid - Scan on 11/5/2019  5:52 PM: NON-OPIOID CONTROLLED SUBSTANCE AGREEMENT      Last Urine Drug Screen: No results found for: CDAUT, No results found for: COMDAT, No results found for: THC13, PCP13, COC13, MAMP13, OPI13, AMP13, BZO13, TCA13, MTD13, BAR13, OXY13, PPX13, BUP13     [email or web address]     checked in past 3 months?  Yes 8/3/20, no concerns     Asiya SOLN, RN

## 2020-08-28 NOTE — TELEPHONE ENCOUNTER
Reason for Call:  Medication or medication refill:    Do you use a [facility]?  Name of the pharmacy and phone number for the current request:  [facility] [place]    Name of the medication requested: Adderall    Other request: leaving town for 2 weeks, states they are leaving Sunday or Monday. I did let her know that it most likely would not be done before Monday.     Can we leave a detailed message on this number? YES    Phone number patient can be reached at: Home number on file [phone] (home)    Best Time: any     Call taken on 8/28/2020 at 3:02 PM by Rin Simpson

## 2020-08-29 RX ORDER — DEXTROAMPHETAMINE SACCHARATE, AMPHETAMINE ASPARTATE, DEXTROAMPHETAMINE SULFATE AND AMPHETAMINE SULFATE 7.5; 7.5; 7.5; 7.5 MG/1; MG/1; MG/1; MG/1
30 TABLET ORAL 2 TIMES DAILY
Qty: 60 TABLET | Refills: 0 | Status: SHIPPED | OUTPATIENT
Start: 2020-08-29 | End: 2020-09-28

## 2020-09-28 ENCOUNTER — TELEPHONE (OUTPATIENT)
Dept: FAMILY MEDICINE | Facility: CLINIC | Age: 30
End: 2020-09-28

## 2020-09-28 DIAGNOSIS — F90.9 ATTENTION DEFICIT HYPERACTIVITY DISORDER (ADHD), UNSPECIFIED ADHD TYPE: ICD-10-CM

## 2020-09-28 RX ORDER — DEXTROAMPHETAMINE SACCHARATE, AMPHETAMINE ASPARTATE, DEXTROAMPHETAMINE SULFATE AND AMPHETAMINE SULFATE 7.5; 7.5; 7.5; 7.5 MG/1; MG/1; MG/1; MG/1
30 TABLET ORAL 2 TIMES DAILY
Qty: 60 TABLET | Refills: 0 | Status: SHIPPED | OUTPATIENT
Start: 2020-09-28 | End: 2020-10-28

## 2020-09-28 RX ORDER — DEXTROAMPHETAMINE SACCHARATE, AMPHETAMINE ASPARTATE, DEXTROAMPHETAMINE SULFATE AND AMPHETAMINE SULFATE 7.5; 7.5; 7.5; 7.5 MG/1; MG/1; MG/1; MG/1
30 TABLET ORAL 2 TIMES DAILY
Qty: 60 TABLET | Refills: 0 | Status: SHIPPED | OUTPATIENT
Start: 2020-09-28 | End: 2020-09-28

## 2020-09-28 NOTE — TELEPHONE ENCOUNTER
Reason for Call:  Medication or medication refill:    Do you use a [facility]?  Name of the pharmacy and phone number for the current request:  [id number] - [place], [place] - [address]  [phone]    Name of the medication requested: amphetamine-dextroamphetamine (ADDERALL) 30 MG tablet    Other request: patient is calling for refill. Thank  You.    Can we leave a detailed message on this number? YES    Phone number patient can be reached at: Home number on file [phone] (home)    Best Time:     Call taken on 9/28/2020 at 1:17 PM by Lizet Lockhart

## 2020-09-28 NOTE — TELEPHONE ENCOUNTER
Rich from [facility] states the adderall needs to either be sent in electronically or she needs to bring in a paper copy. Please advise

## 2020-09-28 NOTE — TELEPHONE ENCOUNTER
Sig was too long. RN eliminated a blank line from Rx.   Dr Portillo, please resend Rx to pharmacy.    Asiya García BSN, RN

## 2020-09-28 NOTE — TELEPHONE ENCOUNTER
Controlled Substance Refill Request for Adderall  Problem List Complete:  Yes     Last Written Prescription Date:  8/29/20  Last Fill Quantity: 60,   # refills: 0     Last Office Visit with [facility] [place] care provider: 5/13/20     Controlled substance agreement:   Encounter-Level CSA:    There are no encounter-level csa.      Patient-Level CSA:    Controlled Substance Agreement - Non - Opioid - Scan on 11/5/2019  5:52 PM: NON-OPIOID CONTROLLED SUBSTANCE AGREEMENT      Last Urine Drug Screen: No results found for: CDAUT, No results found for: COMDAT, No results found for: THC13, PCP13, COC13, MAMP13, OPI13, AMP13, BZO13, TCA13, MTD13, BAR13, OXY13, PPX13, BUP13     [email or web address]      checked in past 3 months?  Yes 8/3/20, no concerns      Asiya SOLN, RN

## 2020-10-23 ENCOUNTER — TELEPHONE (OUTPATIENT)
Dept: FAMILY MEDICINE | Facility: CLINIC | Age: 30
End: 2020-10-23

## 2020-10-23 DIAGNOSIS — F90.9 ATTENTION DEFICIT HYPERACTIVITY DISORDER (ADHD), UNSPECIFIED ADHD TYPE: ICD-10-CM

## 2020-10-23 NOTE — TELEPHONE ENCOUNTER
Reason for Call:  Other prescription    Detailed comments: Patient calling ahead of time to request a refill for the Adderall medication. She still has a few days, but just wanted to call in get that refill request put in. Please give patient a call if you have any questions. Thank you.     Phone Number Patient can be reached at: Home number on file [phone] (home)    Best Time: Anytime    Can we leave a detailed message on this number? YES    Call taken on 10/23/2020 at 6:03 PM by Dennys Mccormack

## 2020-10-26 RX ORDER — DEXTROAMPHETAMINE SACCHARATE, AMPHETAMINE ASPARTATE, DEXTROAMPHETAMINE SULFATE AND AMPHETAMINE SULFATE 7.5; 7.5; 7.5; 7.5 MG/1; MG/1; MG/1; MG/1
30 TABLET ORAL 2 TIMES DAILY
Qty: 60 TABLET | Refills: 0 | Status: CANCELLED | OUTPATIENT
Start: 2020-10-26

## 2020-10-26 NOTE — TELEPHONE ENCOUNTER
"Reason for Call:  Other call back    Detailed comments: patient is calling back,  did advise to schedule \"in person\" appt for any refills, but patient declined states still wants to send message to provider to do video only. Please call to discuss. Patient states VM is not set up as well. Thank you.    Phone Number Patient can be reached at: Home number on file [phone] (home)    Best Time:     Can we leave a detailed message on this number? YES    Call taken on 10/26/2020 at 2:19 PM by Lizet Lockhart      "

## 2020-10-26 NOTE — TELEPHONE ENCOUNTER
I have not physically seen her in clinic in a year  With controlled substance refills I am okay with one virtual visit per year , but the other one needs to be in person.    Katharine Montalvo MD

## 2020-10-26 NOTE — TELEPHONE ENCOUNTER
She was told at last refill she needs to be seen in clinic for further refills  Declined til she schedules appointment  It needs to be in clinic as I have not seen her in a year.   Okay to use virtual slot for in-clinic visit.     Katharine Montalvo MD

## 2020-10-28 ENCOUNTER — TELEPHONE (OUTPATIENT)
Dept: FAMILY MEDICINE | Facility: CLINIC | Age: 30
End: 2020-10-28

## 2020-10-28 DIAGNOSIS — F90.9 ATTENTION DEFICIT HYPERACTIVITY DISORDER (ADHD), UNSPECIFIED ADHD TYPE: ICD-10-CM

## 2020-10-28 RX ORDER — DEXTROAMPHETAMINE SACCHARATE, AMPHETAMINE ASPARTATE, DEXTROAMPHETAMINE SULFATE AND AMPHETAMINE SULFATE 7.5; 7.5; 7.5; 7.5 MG/1; MG/1; MG/1; MG/1
30 TABLET ORAL 2 TIMES DAILY
Qty: 60 TABLET | Refills: 0 | Status: SHIPPED | OUTPATIENT
Start: 2020-10-28 | End: 2020-10-28

## 2020-10-28 RX ORDER — DEXTROAMPHETAMINE SACCHARATE, AMPHETAMINE ASPARTATE, DEXTROAMPHETAMINE SULFATE AND AMPHETAMINE SULFATE 7.5; 7.5; 7.5; 7.5 MG/1; MG/1; MG/1; MG/1
30 TABLET ORAL 2 TIMES DAILY
Qty: 60 TABLET | Refills: 0 | Status: SHIPPED | OUTPATIENT
Start: 2020-10-28 | End: 2020-12-01

## 2020-10-28 NOTE — TELEPHONE ENCOUNTER
Reason for call:  Other   Patient called regarding (reason for call): call back  Additional comments: Patient is calling because the pharmacy didn't have enough pills to refill amphetamine-dextroamphetamine (ADDERALL) 30 MG tablet  [facility]   Please call back with questions.  Phone number to reach patient:  Home number on file [phone] (home)    Best Time:  Any     Can we leave a detailed message on this number?  YES    Travel screening: Negative

## 2020-10-28 NOTE — TELEPHONE ENCOUNTER
Patient christian has an appointment with you next Wednesday 11-4-20 but will run out of [employer] before then and would like a refill.    Thank you.

## 2020-10-28 NOTE — TELEPHONE ENCOUNTER
Controlled Substance Refill Request for Adderall 30 mg  Problem List Complete:  Yes     Last Written Prescription Date:  9/28/20  Last Fill Quantity: 60,   # refills: 0     Last Office Visit with [facility] primary care provider: 5/13/20  Pending appointment:  PCP 11/4/2020     Controlled substance agreement:   Encounter-Level CSA:    There are no encounter-level csa.      Patient-Level CSA:    Controlled Substance Agreement - Non - Opioid - Scan on 11/5/2019  5:52 PM: NON-OPIOID CONTROLLED SUBSTANCE AGREEMENT      Last Urine Drug Screen: No results found for: CDAUT, No results found for: COMDAT, No results found for: THC13, PCP13, COC13, MAMP13, OPI13, AMP13, BZO13, TCA13, MTD13, BAR13, OXY13, PPX13, BUP13     [email or web address]      checked in past 3 months?  Yes 8/3/20, no concerns      Asiya SOLN, RN

## 2020-10-28 NOTE — TELEPHONE ENCOUNTER
Per [facility] pharmacy, they did not have enough pills to fill prescription for patient, therefore did not fill prescription, have deleted prescription at this time    Needing prescription to go to [facility] pharmacy    Shireen SOLN, RN, CPN

## 2020-10-29 NOTE — TELEPHONE ENCOUNTER
Called patient and let her know that we re-sent the adderall Rx last night at 8:45 pm.    Asiya SOLN, RN

## 2020-11-25 ENCOUNTER — TELEPHONE (OUTPATIENT)
Dept: FAMILY MEDICINE | Facility: CLINIC | Age: 30
End: 2020-11-25

## 2020-11-25 DIAGNOSIS — F90.9 ATTENTION DEFICIT HYPERACTIVITY DISORDER (ADHD), UNSPECIFIED ADHD TYPE: ICD-10-CM

## 2020-11-26 NOTE — TELEPHONE ENCOUNTER
Reason for call:  Other   Patient called regarding (reason for call): call back  Additional comments: needs refill for Adderall    Phone number to reach patient:  Cell number on file:    Telephone Information:   Mobile [phone]       Best Time:  anytime    Can we leave a detailed message on this number?  YES    Travel screening: Negative

## 2020-11-27 ENCOUNTER — NURSE TRIAGE (OUTPATIENT)
Dept: NURSING | Facility: CLINIC | Age: 30
End: 2020-11-27

## 2020-11-27 RX ORDER — DEXTROAMPHETAMINE SACCHARATE, AMPHETAMINE ASPARTATE, DEXTROAMPHETAMINE SULFATE AND AMPHETAMINE SULFATE 7.5; 7.5; 7.5; 7.5 MG/1; MG/1; MG/1; MG/1
30 TABLET ORAL 2 TIMES DAILY
Qty: 60 TABLET | Refills: 0 | OUTPATIENT
Start: 2020-11-27

## 2020-11-27 NOTE — TELEPHONE ENCOUNTER
Controlled Substance Refill Request for Adderall  Problem List Complete:  Yes    Patient is followed by SHAYY BALTAZAR for ongoing prescription of stimulants.  All refills should be approved by this provider, or covering partner.     Medication(s): adderall 30 bid.   Maximum quantity per month: 60  Clinic visit frequency required: Q 6  months      Controlled substance agreement on file: Yes       Date(s): 10/30/19  Neuropsych evaluation for ADD completed:  No          checked in past 3 months?  RN unable to view due to  not working      Shireen SOLN, RN, CPN

## 2020-11-27 NOTE — TELEPHONE ENCOUNTER
Patient called in and asked if the prescription can get sent to the [facility] on file instead of [facility].

## 2020-11-28 NOTE — TELEPHONE ENCOUNTER
"Patient reports that she missed a call earlier today.      Refill prescription sent this morning.     No further questions.     Pennie Qureshi RN/[facility]    Additional Information    Negative: Drug overdose and nurse unable to answer question    Negative: Caller requesting information not related to medicine    Negative: Caller requesting a prescription for Strep throat and has a positive culture result    Negative: Rash while taking a medication or within 3 days of stopping it    Negative: Immunization reaction suspected    Negative: [1] Asthma AND [2] having symptoms of asthma (cough, wheezing, etc)    Negative: MORE THAN A DOUBLE DOSE of a prescription or over-the-counter (OTC) drug    Negative: [1] DOUBLE DOSE (an extra dose or lesser amount) of over-the-counter (OTC) drug AND [2] any symptoms (e.g., dizziness, nausea, pain, sleepiness)    Negative: [1] DOUBLE DOSE (an extra dose or lesser amount) of prescription drug AND [2] any symptoms (e.g., dizziness, nausea, pain, sleepiness)    Negative: Took another person's prescription drug    Negative: [1] DOUBLE DOSE (an extra dose or lesser amount) of prescription drug AND [2] NO symptoms (Exception: a double dose of antibiotics)    Negative: Diabetes drug error or overdose (e.g., insulin or extra dose)    Negative: [1] Request for URGENT new prescription or refill of \"essential\" medication (i.e., likelihood of harm to patient if not taken) AND [2] triager unable to fill per unit policy    Negative: [1] Prescription not at pharmacy AND [2] was prescribed today by PCP    Negative: Pharmacy calling with prescription questions and triager unable to answer question    Negative: Caller has urgent medication question about med that PCP prescribed and triager unable to answer question    Negative: Caller has NON-URGENT medication question about med that PCP prescribed and triager unable to answer question    Negative: Caller requesting a NON-URGENT " new prescription or refill and triager unable to refill per unit policy    Negative: Caller has medication question about med not prescribed by PCP and triager unable to answer question (e.g., compatibility with other med, storage)    Negative: [1] DOUBLE DOSE (an extra dose or lesser amount) of over-the-counter (OTC) drug AND [2] NO symptoms    Negative: [1] DOUBLE DOSE (an extra dose or lesser amount) of antibiotic drug AND [2] NO symptoms    Caller has medication question only, adult not sick, and triager answers question    Protocols used: MEDICATION QUESTION CALL-A-

## 2020-11-29 ENCOUNTER — TELEPHONE (OUTPATIENT)
Dept: FAMILY MEDICINE | Facility: CLINIC | Age: 30
End: 2020-11-29

## 2020-12-01 ENCOUNTER — TELEPHONE (OUTPATIENT)
Dept: FAMILY MEDICINE | Facility: CLINIC | Age: 30
End: 2020-12-01

## 2020-12-01 DIAGNOSIS — F90.9 ATTENTION DEFICIT HYPERACTIVITY DISORDER (ADHD), UNSPECIFIED ADHD TYPE: ICD-10-CM

## 2020-12-01 RX ORDER — DEXTROAMPHETAMINE SACCHARATE, AMPHETAMINE ASPARTATE, DEXTROAMPHETAMINE SULFATE AND AMPHETAMINE SULFATE 7.5; 7.5; 7.5; 7.5 MG/1; MG/1; MG/1; MG/1
30 TABLET ORAL 2 TIMES DAILY
Qty: 60 TABLET | Refills: 0 | Status: SHIPPED | OUTPATIENT
Start: 2020-12-01 | End: 2021-01-07

## 2020-12-01 NOTE — TELEPHONE ENCOUNTER
Pending appointment with PCP 12/7/20.    Patient is followed by SHAYY BALTAZAR for ongoing prescription of stimulants.  All refills should be approved by this provider, or covering partner.    Medication(s): adderall 30 bid.   Maximum quantity per month: 60  Clinic visit frequency required: Q 6  months     Controlled substance agreement on file: Yes       Date(s): 10/30/19  Neuropsych evaluation for ADD completed:  No    Last [facility] website verification:  8/3/20    Asiya SOLN, RN

## 2020-12-01 NOTE — TELEPHONE ENCOUNTER
rx refilled.  If she no shows again, it will not be filled until she is seen    Katharine Montalvo MD

## 2020-12-01 NOTE — TELEPHONE ENCOUNTER
Patient calling is completely out of her adderall, has a phone visit scheduled for 12/17. Wondering if a refill could be called in.

## 2020-12-04 ENCOUNTER — TELEPHONE (OUTPATIENT)
Dept: FAMILY MEDICINE | Facility: CLINIC | Age: 30
End: 2020-12-04

## 2020-12-04 NOTE — TELEPHONE ENCOUNTER
I called the patient [phone] and no one answered.  Her voicemail has not been set up yet.  Unable to leave a message. MyChart is pending.  I will call back later.  Caroline Solis,

## 2020-12-04 NOTE — TELEPHONE ENCOUNTER
I spoke to the patient and I rescheduled her appointment into a face to face appointment on 12/14/20.  Caroline Solis,

## 2020-12-04 NOTE — TELEPHONE ENCOUNTER
This one needs to be in clinic visit and you can convert this virtual appt to a clinic appt at same time.  Last one was virtual  I need to physically see her at least once per year  She no showed her last appt.    Katharine Montalvo MD

## 2020-12-14 ENCOUNTER — TELEPHONE (OUTPATIENT)
Dept: FAMILY MEDICINE | Facility: CLINIC | Age: 30
End: 2020-12-14

## 2020-12-14 DIAGNOSIS — F90.9 ATTENTION DEFICIT HYPERACTIVITY DISORDER (ADHD), UNSPECIFIED ADHD TYPE: ICD-10-CM

## 2020-12-14 RX ORDER — DEXTROAMPHETAMINE SACCHARATE, AMPHETAMINE ASPARTATE, DEXTROAMPHETAMINE SULFATE AND AMPHETAMINE SULFATE 7.5; 7.5; 7.5; 7.5 MG/1; MG/1; MG/1; MG/1
30 TABLET ORAL 2 TIMES DAILY
Qty: 60 TABLET | Refills: 0 | OUTPATIENT
Start: 2020-12-14

## 2020-12-14 NOTE — TELEPHONE ENCOUNTER
Pt just had this filled on 12/1/20 and I can see thru  it was picked up.  THere will be no more refills until she is physically seen  Twice we have refilled her meds and she has no-shown    No further refills til she is seen at her appt.    Katharine Montalvo MD

## 2020-12-14 NOTE — TELEPHONE ENCOUNTER
Reason for Call:  Medication or medication refill:    Do you use a [facility]?  Name of the pharmacy and phone number for the current request:  [id number] - [place], [place] - [address]  [phone]    Name of the medication requested: amphetamine-dextroamphetamine (ADDERALL) 30 MG tablet    Other request: patient is calling to request refill until can be seen, scheduled appt for 01/11/21. Please call to advise. Thank  You.    Can we leave a detailed message on this number? YES    Phone number patient can be reached at: Home number on file [phone] (home)    Best Time:     Call taken on 12/14/2020 at 1:41 PM by Lizet Lockhart

## 2020-12-14 NOTE — TELEPHONE ENCOUNTER
Patient no-showed appointment today and scheduled appointment with PCP in Jan.  Patient has no-showed the last 2 appointments.     Asiya SOLN, RN

## 2020-12-29 ENCOUNTER — TELEPHONE (OUTPATIENT)
Dept: FAMILY MEDICINE | Facility: CLINIC | Age: 30
End: 2020-12-29

## 2020-12-29 DIAGNOSIS — F90.9 ATTENTION DEFICIT HYPERACTIVITY DISORDER (ADHD), UNSPECIFIED ADHD TYPE: ICD-10-CM

## 2020-12-29 NOTE — TELEPHONE ENCOUNTER
Reason for Call:  Other prescription  amphetamine-dextroamphetamine (ADDERALL) 30 MG tablet refill    Detailed comments: needs refill please    Phone Number Patient can be reached at: Cell number on file:    Telephone Information:   Mobile [phone]       Best Time: anytime    Can we leave a detailed message on this number? YES   Caller informed that calls received after 3pm may not be returned same day.    Call taken on 12/29/2020 at 5:14 PM by Zakiya Fried

## 2020-12-30 RX ORDER — DEXTROAMPHETAMINE SACCHARATE, AMPHETAMINE ASPARTATE, DEXTROAMPHETAMINE SULFATE AND AMPHETAMINE SULFATE 7.5; 7.5; 7.5; 7.5 MG/1; MG/1; MG/1; MG/1
30 TABLET ORAL 2 TIMES DAILY
Qty: 60 TABLET | Refills: 0 | OUTPATIENT
Start: 2020-12-30

## 2020-12-30 NOTE — TELEPHONE ENCOUNTER
Patient is followed by SHAYY BALTAZAR for ongoing prescription of stimulants.  All refills should be approved by this provider, or covering partner.     Medication(s): adderall 30 bid.   Maximum quantity per month: 60  Clinic visit frequency required: Q 6  months      Controlled substance agreement on file: Yes       Date(s): 10/30/19  Neuropsych evaluation for ADD completed:  No     Last [facility] website verification:  8/3/20     Asiya SOLN, RN

## 2020-12-31 ENCOUNTER — NURSE TRIAGE (OUTPATIENT)
Dept: NURSING | Facility: CLINIC | Age: 30
End: 2020-12-31

## 2020-12-31 NOTE — TELEPHONE ENCOUNTER
This will not be refilled.   She no-showed me on  11/4 and 12/14.   She needs to be seen for further refills    Katharine Montalvo MD

## 2020-12-31 NOTE — TELEPHONE ENCOUNTER
Patient is calling back, She is out of medication and would like the  adderall filled before Monday. Please advise.Patricia Palacios MA/BIJAN

## 2021-01-01 NOTE — TELEPHONE ENCOUNTER
Reason for Disposition    Caller has NON-URGENT medication question about med that PCP prescribed and triager unable to answer question    Protocols used: MEDICATION QUESTION CALL-A-AH

## 2021-01-01 NOTE — TELEPHONE ENCOUNTER
Patient calling - asking if Adderall RX has been refilled.  Advised patient that per her PCP, she will need to be seen before this is refilled to due two recent no-shows.  Patient understands.  No further questions at this time.    Denise Saha RN  Triage Nurse Advisor

## 2021-01-04 NOTE — TELEPHONE ENCOUNTER
Tried calling patient, no voicemail, I could not leave a message. If patient called back, Dr Cali will not refill medication until she comes in for an appointment.Patricia Palacios MA/BIJAN

## 2021-01-07 ENCOUNTER — OFFICE VISIT (OUTPATIENT)
Dept: FAMILY MEDICINE | Facility: CLINIC | Age: 31
End: 2021-01-07
Payer: COMMERCIAL

## 2021-01-07 VITALS
SYSTOLIC BLOOD PRESSURE: 132 MMHG | HEART RATE: 113 BPM | RESPIRATION RATE: 18 BRPM | WEIGHT: 289 LBS | DIASTOLIC BLOOD PRESSURE: 86 MMHG | HEIGHT: 68 IN | OXYGEN SATURATION: 96 % | BODY MASS INDEX: 43.8 KG/M2

## 2021-01-07 DIAGNOSIS — M54.41 LOW BACK PAIN WITH BILATERAL SCIATICA, UNSPECIFIED BACK PAIN LATERALITY, UNSPECIFIED CHRONICITY: ICD-10-CM

## 2021-01-07 DIAGNOSIS — F90.9 ATTENTION DEFICIT HYPERACTIVITY DISORDER (ADHD), UNSPECIFIED ADHD TYPE: Primary | ICD-10-CM

## 2021-01-07 DIAGNOSIS — M54.42 LOW BACK PAIN WITH BILATERAL SCIATICA, UNSPECIFIED BACK PAIN LATERALITY, UNSPECIFIED CHRONICITY: ICD-10-CM

## 2021-01-07 PROBLEM — M54.9 BACK PAIN: Status: ACTIVE | Noted: 2021-01-07

## 2021-01-07 PROCEDURE — 99213 OFFICE O/P EST LOW 20 MIN: CPT | Performed by: PHYSICIAN ASSISTANT

## 2021-01-07 RX ORDER — DEXTROAMPHETAMINE SACCHARATE, AMPHETAMINE ASPARTATE, DEXTROAMPHETAMINE SULFATE AND AMPHETAMINE SULFATE 7.5; 7.5; 7.5; 7.5 MG/1; MG/1; MG/1; MG/1
30 TABLET ORAL 2 TIMES DAILY
Qty: 60 TABLET | Refills: 0 | Status: SHIPPED | OUTPATIENT
Start: 2021-01-07 | End: 2021-02-03

## 2021-01-07 RX ORDER — OXYCODONE HYDROCHLORIDE 5 MG/1
10 TABLET ORAL 2 TIMES DAILY PRN
Qty: 6 TABLET | Refills: 0 | COMMUNITY
Start: 2021-01-07 | End: 2022-06-15

## 2021-01-07 ASSESSMENT — MIFFLIN-ST. JEOR: SCORE: 2076.46

## 2021-01-07 NOTE — PROGRESS NOTES
"    Camille Jacinto is a 30 year old who presents to clinic today for the following health issues     HPI       Medication Followup of Adderall     Taking Medication as prescribed: yes    Side Effects:  None    Medication Helping Symptoms:  yes   Patient is new to me requesting refill for Adderall.  She states she has been on this for over 10 years.  She is unclear it when she was initially evaluated in by who.  Charts were reviewed in clinic today but no available resources were found in chart regarding evaluation.She is new to our clinics and has been establishing care with Dr. Portillo.  She has not been available for an appointment.  She is out of her medications.  She states it helps.    Past medical history is consistent for chronic low back pain with bilateral sciatic type symptoms going on for 9+ years.  She is currently seeing pain management and spine specialist.  She states she is had multiple cortisone injections and physical therapy with minimal relief.  She is currently taking oxycodone 10 mg twice a day for the last 10 years.      Review of Systems   Constitutional, HEENT, cardiovascular, pulmonary, gi and gu systems are negative, except as otherwise noted.      Objective    /86   Pulse 113   Resp 18   Ht 1.715 m (5' 7.5\")   Wt 131.1 kg (289 lb)   SpO2 96%   BMI 44.60 kg/m    Body mass index is 44.6 kg/m .     Physical Exam   GENERAL: healthy, alert and no distress  PSYCH: mentation appears normal, affect normal/bright      ICD-10-CM    1. Attention deficit hyperactivity disorder (ADHD), unspecified ADHD type  F90.9 amphetamine-dextroamphetamine (ADDERALL) 30 MG tablet   2. Low back pain with bilateral sciatica, unspecified back pain laterality, unspecified chronicity  M54.42     M54.41    Talk to patient about her concerns.  Short supply of her Adderall was given to her today I encouraged her to establish with one provider for continuity of care.  I informed her that while I will not " refill this in the future.  I have concerns with her using her narcotic with a stimulant.  She is to follow-up with Dr. Portillo in 4 weeks for continued care.    PDMP Review       Value Time User    State PDMP site checked  Yes 1/7/2021  4:56 PM Rojas Lemon PA-C Andrew Oppel PA-C

## 2021-01-29 ENCOUNTER — TELEPHONE (OUTPATIENT)
Dept: FAMILY MEDICINE | Facility: CLINIC | Age: 31
End: 2021-01-29

## 2021-01-29 DIAGNOSIS — F90.9 ATTENTION DEFICIT HYPERACTIVITY DISORDER (ADHD), UNSPECIFIED ADHD TYPE: ICD-10-CM

## 2021-01-29 RX ORDER — DEXTROAMPHETAMINE SACCHARATE, AMPHETAMINE ASPARTATE, DEXTROAMPHETAMINE SULFATE AND AMPHETAMINE SULFATE 7.5; 7.5; 7.5; 7.5 MG/1; MG/1; MG/1; MG/1
30 TABLET ORAL 2 TIMES DAILY
Qty: 60 TABLET | Refills: 0 | OUTPATIENT
Start: 2021-01-29

## 2021-01-29 NOTE — TELEPHONE ENCOUNTER
Routing refill request to provider for review/approval because:  Drug not on the [facility] refill protocol     Asiya SOLN, RN

## 2021-01-29 NOTE — TELEPHONE ENCOUNTER
Reason for Call:  Medication or medication refill:    Do you use a [facility]?  Name of the pharmacy and phone number for the current request:  [facility] #[id number] - [place], [place] - [address]    Name of the medication requested: amphetamine-dextroamphetamine (ADDERALL) 30 MG tablet       Other request: Patient wants medication on 2/4 due to going out of town on  2/5.    Can we leave a detailed message on this number? YES    Phone number patient can be reached at: Home number on file [phone] (home)    Best Time: any    Call taken on 1/29/2021 at 2:50 PM by Cristina Upton

## 2021-02-01 ENCOUNTER — NURSE TRIAGE (OUTPATIENT)
Dept: NURSING | Facility: CLINIC | Age: 31
End: 2021-02-01

## 2021-02-02 ENCOUNTER — TELEPHONE (OUTPATIENT)
Dept: FAMILY MEDICINE | Facility: CLINIC | Age: 31
End: 2021-02-02

## 2021-02-02 RX ORDER — DEXTROAMPHETAMINE SACCHARATE, AMPHETAMINE ASPARTATE, DEXTROAMPHETAMINE SULFATE AND AMPHETAMINE SULFATE 7.5; 7.5; 7.5; 7.5 MG/1; MG/1; MG/1; MG/1
30 TABLET ORAL 2 TIMES DAILY
Qty: 60 TABLET | Refills: 0 | OUTPATIENT
Start: 2021-02-02

## 2021-02-02 NOTE — TELEPHONE ENCOUNTER
Patient notified of provider's message as below. Patient was assisted in making an appointment as listed below. Patient verbalized good understanding, had no further questions and needed no further support.Stephanie Ruff R.N.  Next 5 appointments (look out 90 days)    Feb 03, 2021  4:00 PM  SHORT with Katharine Montalvo MD  [facility] [address] [address] [phone]

## 2021-02-02 NOTE — TELEPHONE ENCOUNTER
Reason for call: Patient returning call checking on the status of her refill. She just got home from work     Patient  can be reached at: [phone]    Call taken at 4:35 pm on 2/2/2021    [facility] Don[facility]  2nd Floor  Primary Care

## 2021-02-02 NOTE — TELEPHONE ENCOUNTER
This has already been declined.. I have not seen her since virtual visit on 5/13/220  Since then she has no showed me twice and cancelled 2 appointments in January  She did see Rojas in January 7th but he told her no further refills until she follows up with me.   She cancelled and appt with me on 1/11/21 and then yesterday. I will forward to Rojas to see if he is willing to refill.     Katharine Montalvo MD

## 2021-02-02 NOTE — TELEPHONE ENCOUNTER
He told her he would not be providing her with further refills and she needs to see her PMD he will not refill  She has not seen me in over a year, has had 2 no show and 2 cancelled appts  I will not refill  I have openings in clinic tomorrrow if she wants to schedule on a same day or team slot. She needs to keep appt.     Katharine Montalvo MD

## 2021-02-02 NOTE — TELEPHONE ENCOUNTER
Looking for prescription refill on 2/4 or 2/5 - would be a day or two early because she is leaving town - could we refill her Adderall on 2/4 or 2/5?    Pharmacy: [facility] RN on 2/1/2021 at 7:50 PM

## 2021-02-02 NOTE — TELEPHONE ENCOUNTER
Reason for Call:  Other prescription    Detailed comments: requested amphetamine-dextroamphetamine (ADDERALL) 30 MG tablet. Patient was informed that she needs to follow up with her PCP- Dr. Cali however she is stating she saw ALONSO Lemon on 1/7/2021 and was of the understanding that would cover her refill requests. She states she will not be able to get in before her family leaves out of state on 2/5/2021. Please advise. No future appt. made.      Phone Number Patient can be reached at: Cell number on file:    Telephone Information:   Mobile [phone]       Best Time: anytime    Can we leave a detailed message on this number? NO    Call taken on 2/2/2021 at 12:31 PM by Zakiya Fried

## 2021-02-02 NOTE — TELEPHONE ENCOUNTER
Tried calling patient. Got a message stating that they do not have a voicemail set up yet.Patricia Palacios MA/TC

## 2021-02-03 ENCOUNTER — OFFICE VISIT (OUTPATIENT)
Dept: FAMILY MEDICINE | Facility: CLINIC | Age: 31
End: 2021-02-03
Payer: COMMERCIAL

## 2021-02-03 VITALS
DIASTOLIC BLOOD PRESSURE: 82 MMHG | BODY MASS INDEX: 47.09 KG/M2 | HEIGHT: 66 IN | WEIGHT: 293 LBS | SYSTOLIC BLOOD PRESSURE: 132 MMHG | TEMPERATURE: 98.7 F | HEART RATE: 89 BPM

## 2021-02-03 DIAGNOSIS — F90.9 ATTENTION DEFICIT HYPERACTIVITY DISORDER (ADHD), UNSPECIFIED ADHD TYPE: ICD-10-CM

## 2021-02-03 PROCEDURE — 99213 OFFICE O/P EST LOW 20 MIN: CPT | Performed by: FAMILY MEDICINE

## 2021-02-03 RX ORDER — DEXTROAMPHETAMINE SACCHARATE, AMPHETAMINE ASPARTATE, DEXTROAMPHETAMINE SULFATE AND AMPHETAMINE SULFATE 7.5; 7.5; 7.5; 7.5 MG/1; MG/1; MG/1; MG/1
30 TABLET ORAL 2 TIMES DAILY
Qty: 60 TABLET | Refills: 0 | Status: SHIPPED | OUTPATIENT
Start: 2021-02-03 | End: 2021-03-01

## 2021-02-03 ASSESSMENT — MIFFLIN-ST. JEOR: SCORE: 2075.33

## 2021-02-03 NOTE — PROGRESS NOTES
"  Assessment & Plan     Attention deficit hyperactivity disorder (ADHD), unspecified ADHD type  Pt here for refill of adhd medication. Discussed importance of compliance with follow-up in clinic. Will refill  - amphetamine-dextroamphetamine (ADDERALL) 30 MG tablet; Take 1 tablet (30 mg) by mouth 2 times daily One tablet first thing in the morning, one 4-5 hours later. Okay for early refill       Tobacco Cessation:   reports that she has been smoking. She has been smoking about 0.00 packs per day. She has never used smokeless tobacco.  Tobacco Cessation Action Plan: Information offered: Patient not interested at this time    BMI:   Estimated body mass index is 47.45 kg/m  as calculated from the following:    Height as of this encounter: 1.676 m (5' 6\").    Weight as of this encounter: 133.4 kg (294 lb).   Weight management plan: Discussed healthy diet and exercise guidelines          No follow-ups on file.    Katharine Montalvo MD  [facility]    Camille Jacinto is a 30 year old who presents to clinic today for the following health issues     HPI       Medication Followup of adderall    Taking Medication as prescribed: yes    Side Effects:  None    Medication Helping Symptoms:  yes     Pt with adhd on adderall  She is tolerating it  BP at goal  No weight loss or tics  She has had several cancelled visits and no shows  Discussed to continue to receive medication, she needs to be compliant and keep regularly scheduled office visits every 6 months.     Review of Systems   Constitutional, HEENT, cardiovascular, pulmonary, gi and gu systems are negative, except as otherwise noted.      Objective    /82   Pulse 89   Temp 98.7  F (37.1  C) (Oral)   Ht 1.676 m (5' 6\")   Wt 133.4 kg (294 lb)   BMI 47.45 kg/m    Body mass index is 47.45 kg/m .  Physical Exam   GENERAL: healthy, alert and no distress  RESP: lungs clear to auscultation - no rales, rhonchi or wheezes  CV: regular rate and " rhythm, normal S1 S2, no S3 or S4, no murmur, click or rub, no peripheral edema and peripheral pulses strong    No results found for this or any previous visit (from the past 24 hour(s)).

## 2021-02-25 DIAGNOSIS — F90.9 ATTENTION DEFICIT HYPERACTIVITY DISORDER (ADHD), UNSPECIFIED ADHD TYPE: ICD-10-CM

## 2021-02-25 NOTE — TELEPHONE ENCOUNTER
Reason for call: patient calling for a refill on the Adderall to be sent to [facility]    Can we leave a detailed message: Yes     Patient  can be reached at: [phone]    Call taken at 6:00 pm on 2/25/2021    [facility] Don[facility]  2nd Floor  Primary Care

## 2021-02-26 NOTE — TELEPHONE ENCOUNTER
Routing refill request to provider for review/approval because:  Drug not on the [facility] refill protocol     Shireen SOLN, RN, CPN

## 2021-03-01 RX ORDER — DEXTROAMPHETAMINE SACCHARATE, AMPHETAMINE ASPARTATE, DEXTROAMPHETAMINE SULFATE AND AMPHETAMINE SULFATE 7.5; 7.5; 7.5; 7.5 MG/1; MG/1; MG/1; MG/1
30 TABLET ORAL 2 TIMES DAILY
Qty: 60 TABLET | Refills: 0 | Status: SHIPPED | OUTPATIENT
Start: 2021-03-01 | End: 2021-03-30

## 2021-03-02 ENCOUNTER — TELEPHONE (OUTPATIENT)
Dept: FAMILY MEDICINE | Facility: CLINIC | Age: 31
End: 2021-03-02

## 2021-03-02 NOTE — TELEPHONE ENCOUNTER
Current Adderall prescription states can't fill before 3/5/21.  She is leaving town tomorrow by noon.  Will be gone until 3/29 and requesting ok for pharmacy to fill her adderall today.  I did make her aware MD not in office this afternoon.  May not get response til tomorrow.  [facility] in [place] on [address].  GUNJAN Gleason

## 2021-03-02 NOTE — TELEPHONE ENCOUNTER
Reason for call: Patient needs a refill Adderall sent in today, 3/2/2021 instead of 3/5/2021. Patient leaving out of town 3/3/2021    Can we leave a detailed message: Yes     Patient  can be reached at: [phone]    Call taken at 2:07 pm  on 3/2/2021    [facility] Donis [facility]  2nd Floor  Primary Care

## 2021-03-02 NOTE — TELEPHONE ENCOUNTER
Refill of Adderall was done yesterday but has note to pharmacy not to fill before 3/5/21.  Patient leaving town tomorrow and is requesting to refill it today.     Routing to provider to advise.  Asiya García BSN, RN

## 2021-03-03 NOTE — TELEPHONE ENCOUNTER
Called pharmacy and informed them ok to refill today.   Called patient and informed her of providers message.     Asiya García BSN, RN

## 2021-03-03 NOTE — TELEPHONE ENCOUNTER
Okay forearly refill but in future needs to let me know if she need early refill    Katharine Montalvo MD

## 2021-03-30 ENCOUNTER — TELEPHONE (OUTPATIENT)
Dept: FAMILY MEDICINE | Facility: CLINIC | Age: 31
End: 2021-03-30

## 2021-03-30 DIAGNOSIS — F90.9 ATTENTION DEFICIT HYPERACTIVITY DISORDER (ADHD), UNSPECIFIED ADHD TYPE: ICD-10-CM

## 2021-03-30 RX ORDER — DEXTROAMPHETAMINE SACCHARATE, AMPHETAMINE ASPARTATE, DEXTROAMPHETAMINE SULFATE AND AMPHETAMINE SULFATE 7.5; 7.5; 7.5; 7.5 MG/1; MG/1; MG/1; MG/1
30 TABLET ORAL 2 TIMES DAILY
Qty: 60 TABLET | Refills: 0 | Status: SHIPPED | OUTPATIENT
Start: 2021-04-05 | End: 2021-05-02

## 2021-03-30 NOTE — TELEPHONE ENCOUNTER
The patient is calling for a refill for amphetamine-dextroamphetamine (ADDERALL) 30 MG tablet.  [facility] pharmacy in [place].  Please call patient at [phone] and advise, okay to leave a message.  Caroline Solis,

## 2021-04-30 ENCOUNTER — TELEPHONE (OUTPATIENT)
Dept: FAMILY MEDICINE | Facility: CLINIC | Age: 31
End: 2021-04-30

## 2021-04-30 DIAGNOSIS — F90.9 ATTENTION DEFICIT HYPERACTIVITY DISORDER (ADHD), UNSPECIFIED ADHD TYPE: ICD-10-CM

## 2021-04-30 NOTE — TELEPHONE ENCOUNTER
.Reason for Call:  Medication or medication refill:    Do you use a [facility]?  Name of the pharmacy and phone number for the current request:  [facility] [phone]    Name of the medication requested:   amphetamine-dextroamphetamine (ADDERALL) 30 MG tablet  30 mg, 2 TIMES DAILY       Other request: none    Can we leave a detailed message on this number? YES    Phone number patient can be reached at: Home number on file [phone] (home)    Best Time: anytime    Call taken on 4/30/2021 at 4:49 PM by Kimberly Zaragoza

## 2021-05-02 RX ORDER — DEXTROAMPHETAMINE SACCHARATE, AMPHETAMINE ASPARTATE, DEXTROAMPHETAMINE SULFATE AND AMPHETAMINE SULFATE 7.5; 7.5; 7.5; 7.5 MG/1; MG/1; MG/1; MG/1
30 TABLET ORAL 2 TIMES DAILY
Qty: 60 TABLET | Refills: 0 | Status: SHIPPED | OUTPATIENT
Start: 2021-05-02 | End: 2021-06-01

## 2021-05-31 ENCOUNTER — TELEPHONE (OUTPATIENT)
Dept: FAMILY MEDICINE | Facility: CLINIC | Age: 31
End: 2021-05-31

## 2021-05-31 DIAGNOSIS — F90.9 ATTENTION DEFICIT HYPERACTIVITY DISORDER (ADHD), UNSPECIFIED ADHD TYPE: ICD-10-CM

## 2021-06-01 RX ORDER — DEXTROAMPHETAMINE SACCHARATE, AMPHETAMINE ASPARTATE, DEXTROAMPHETAMINE SULFATE AND AMPHETAMINE SULFATE 7.5; 7.5; 7.5; 7.5 MG/1; MG/1; MG/1; MG/1
30 TABLET ORAL 2 TIMES DAILY
Qty: 60 TABLET | Refills: 0 | Status: SHIPPED | OUTPATIENT
Start: 2021-06-01 | End: 2021-06-30

## 2021-06-01 NOTE — TELEPHONE ENCOUNTER
Reason for call:  Other   Patient called regarding (reason for call): call back  Additional comments: medication refill reminder    Phone number to reach patient:  Cell number on file:    Telephone Information:   Mobile [phone]       Best Time:  anytime    Can we leave a detailed message on this number?  YES    Travel screening: Negative

## 2021-06-29 DIAGNOSIS — F90.9 ATTENTION DEFICIT HYPERACTIVITY DISORDER (ADHD), UNSPECIFIED ADHD TYPE: ICD-10-CM

## 2021-06-29 NOTE — TELEPHONE ENCOUNTER
Reason for Call:  Medication or medication refill:    Do you use a [facility]?  Name of the pharmacy and phone number for the current request[facility]  [facility] [address] [place] [address]    Name of the medication requested: adderall 30mg    Other request: Pt states that she only has a couple of days left of prescription    Can we leave a detailed message on this number? YES    Phone number patient can be reached at: Home number on file [phone] (home)    Best Time: later in day    Call taken on 6/29/2021 at 1:19 PM by Aminata Valdez

## 2021-06-30 RX ORDER — DEXTROAMPHETAMINE SACCHARATE, AMPHETAMINE ASPARTATE, DEXTROAMPHETAMINE SULFATE AND AMPHETAMINE SULFATE 7.5; 7.5; 7.5; 7.5 MG/1; MG/1; MG/1; MG/1
30 TABLET ORAL 2 TIMES DAILY
Qty: 60 TABLET | Refills: 0 | Status: SHIPPED | OUTPATIENT
Start: 2021-06-30 | End: 2021-07-26

## 2021-07-26 ENCOUNTER — TELEPHONE (OUTPATIENT)
Dept: FAMILY MEDICINE | Facility: CLINIC | Age: 31
End: 2021-07-26

## 2021-07-26 DIAGNOSIS — F90.9 ATTENTION DEFICIT HYPERACTIVITY DISORDER (ADHD), UNSPECIFIED ADHD TYPE: ICD-10-CM

## 2021-07-26 RX ORDER — DEXTROAMPHETAMINE SACCHARATE, AMPHETAMINE ASPARTATE, DEXTROAMPHETAMINE SULFATE AND AMPHETAMINE SULFATE 7.5; 7.5; 7.5; 7.5 MG/1; MG/1; MG/1; MG/1
30 TABLET ORAL 2 TIMES DAILY
Qty: 60 TABLET | Refills: 0 | Status: SHIPPED | OUTPATIENT
Start: 2021-07-26 | End: 2021-08-25

## 2021-08-24 DIAGNOSIS — F90.9 ATTENTION DEFICIT HYPERACTIVITY DISORDER (ADHD), UNSPECIFIED ADHD TYPE: ICD-10-CM

## 2021-08-24 NOTE — TELEPHONE ENCOUNTER
Reason for Call:  Other call back    Detailed comments: pt would like to renew prescription, please call back to confirm    Phone Number Patient can be reached at: Cell number on file:    Telephone Information:   Mobile [phone]       Best Time: any    Can we leave a detailed message on this number? YES    Call taken on 8/24/2021 at 6:32 PM by Nuno Amezquita

## 2021-08-25 RX ORDER — DEXTROAMPHETAMINE SACCHARATE, AMPHETAMINE ASPARTATE, DEXTROAMPHETAMINE SULFATE AND AMPHETAMINE SULFATE 7.5; 7.5; 7.5; 7.5 MG/1; MG/1; MG/1; MG/1
30 TABLET ORAL 2 TIMES DAILY
Qty: 60 TABLET | Refills: 0 | Status: SHIPPED | OUTPATIENT
Start: 2021-08-25 | End: 2021-09-24

## 2021-08-25 NOTE — TELEPHONE ENCOUNTER
Routing refill request to provider for review/approval because:  Drug not on the [facility] refill protocol   Cielo Hernandez BSN, RN

## 2021-09-21 DIAGNOSIS — F90.9 ATTENTION DEFICIT HYPERACTIVITY DISORDER (ADHD), UNSPECIFIED ADHD TYPE: ICD-10-CM

## 2021-09-21 RX ORDER — DEXTROAMPHETAMINE SACCHARATE, AMPHETAMINE ASPARTATE, DEXTROAMPHETAMINE SULFATE AND AMPHETAMINE SULFATE 7.5; 7.5; 7.5; 7.5 MG/1; MG/1; MG/1; MG/1
30 TABLET ORAL 2 TIMES DAILY
Qty: 60 TABLET | Refills: 0 | OUTPATIENT
Start: 2021-09-21

## 2021-09-21 NOTE — TELEPHONE ENCOUNTER
Reason for Call:  Medication or medication refill:    Do you use a [facility]?  Name of the pharmacy and phone number for the current request:  [facility] [place] [phone]    Name of the medication requested: amphetamine-dextroamphetamine (ADDERALL) 30 MG tablet    Other request: none    Can we leave a detailed message on this number? YES    Phone number patient can be reached at: Cell number on file:    Telephone Information:   Mobile [phone]       Best Time: anytime    Call taken on 9/21/2021 at 9:37 AM by Wilma Garcia

## 2021-09-21 NOTE — TELEPHONE ENCOUNTER
Routing refill request to provider for review/approval because:  Marge given x1 and patient did not follow up, please advise  Drug not on the [facility] refill protocol   Angela Molina RN

## 2021-09-23 DIAGNOSIS — F90.9 ATTENTION DEFICIT HYPERACTIVITY DISORDER (ADHD), UNSPECIFIED ADHD TYPE: ICD-10-CM

## 2021-09-23 NOTE — TELEPHONE ENCOUNTER
Pt calling for status of adderall refill request.  See previous messages.  Advised pt provider declined refill because she is due for ov. Pt states no one told her. Advised it should have been on the last adderall label from pharmacy.      Pt transferred to scheduling and will make appointment.    Advised I would send message to provider requesting refill to appointment scheduled.  Cielo SOLN, RN

## 2021-09-24 RX ORDER — DEXTROAMPHETAMINE SACCHARATE, AMPHETAMINE ASPARTATE, DEXTROAMPHETAMINE SULFATE AND AMPHETAMINE SULFATE 7.5; 7.5; 7.5; 7.5 MG/1; MG/1; MG/1; MG/1
30 TABLET ORAL 2 TIMES DAILY
Qty: 60 TABLET | Refills: 0 | Status: SHIPPED | OUTPATIENT
Start: 2021-09-24 | End: 2021-10-21

## 2021-10-21 ENCOUNTER — OFFICE VISIT (OUTPATIENT)
Dept: FAMILY MEDICINE | Facility: CLINIC | Age: 31
End: 2021-10-21
Payer: COMMERCIAL

## 2021-10-21 VITALS
BODY MASS INDEX: 47.09 KG/M2 | WEIGHT: 293 LBS | HEART RATE: 92 BPM | DIASTOLIC BLOOD PRESSURE: 89 MMHG | HEIGHT: 66 IN | TEMPERATURE: 98.9 F | SYSTOLIC BLOOD PRESSURE: 139 MMHG | OXYGEN SATURATION: 97 % | RESPIRATION RATE: 18 BRPM

## 2021-10-21 DIAGNOSIS — F90.9 ATTENTION DEFICIT HYPERACTIVITY DISORDER (ADHD), UNSPECIFIED ADHD TYPE: ICD-10-CM

## 2021-10-21 PROCEDURE — 99213 OFFICE O/P EST LOW 20 MIN: CPT | Performed by: FAMILY MEDICINE

## 2021-10-21 RX ORDER — DEXTROAMPHETAMINE SACCHARATE, AMPHETAMINE ASPARTATE, DEXTROAMPHETAMINE SULFATE AND AMPHETAMINE SULFATE 7.5; 7.5; 7.5; 7.5 MG/1; MG/1; MG/1; MG/1
30 TABLET ORAL 2 TIMES DAILY
Qty: 60 TABLET | Refills: 0 | Status: SHIPPED | OUTPATIENT
Start: 2021-10-21 | End: 2021-11-18

## 2021-10-21 ASSESSMENT — MIFFLIN-ST. JEOR: SCORE: 2088.47

## 2021-10-21 ASSESSMENT — PAIN SCALES - GENERAL: PAINLEVEL: NO PAIN (0)

## 2021-10-21 NOTE — PROGRESS NOTES
"  Assessment & Plan     Attention deficit hyperactivity disorder (ADHD), unspecified ADHD type  Doing well, no concerns. Needs refill  - amphetamine-dextroamphetamine (ADDERALL) 30 MG tablet; Take 1 tablet (30 mg) by mouth 2 times daily One tablet first thing in the morning, one 4-5 hours later. Okay to refill today             BMI:   Estimated body mass index is 48.1 kg/m  as calculated from the following:    Height as of this encounter: 1.676 m (5' 6\").    Weight as of this encounter: 135.2 kg (298 lb).   Weight management plan: Discussed healthy diet and exercise guidelines        No follow-ups on file.    Katharine Montalvo MD  [facility]    Camille Jacinto is a 31 year old who presents for the following health issues     HPI     Medication Followup of adderal 30 mg    Taking Medication as prescribed: yes    Side Effects:  None    Medication Helping Symptoms:  yes     Pt doing well on meds  Is on narcotics per pain clinic  No tics, BP okay and no weight loss    Review of Systems   negative      Objective    /89   Pulse 92   Temp 98.9  F (37.2  C) (Oral)   Resp 18   Ht 1.676 m (5' 6\")   Wt 135.2 kg (298 lb)   LMP 10/05/2021   SpO2 97%   BMI 48.10 kg/m    Body mass index is 48.1 kg/m .  Physical Exam   GENERAL: healthy, alert and no distress  RESP: lungs clear to auscultation - no rales, rhonchi or wheezes  CV: regular rate and rhythm, normal S1 S2, no S3 or S4, no murmur, click or rub, no peripheral edema and peripheral pulses strong    No results found for this or any previous visit (from the past 24 hour(s)).            "

## 2021-11-18 DIAGNOSIS — F90.9 ATTENTION DEFICIT HYPERACTIVITY DISORDER (ADHD), UNSPECIFIED ADHD TYPE: ICD-10-CM

## 2021-11-18 RX ORDER — DEXTROAMPHETAMINE SACCHARATE, AMPHETAMINE ASPARTATE, DEXTROAMPHETAMINE SULFATE AND AMPHETAMINE SULFATE 7.5; 7.5; 7.5; 7.5 MG/1; MG/1; MG/1; MG/1
30 TABLET ORAL 2 TIMES DAILY
Qty: 60 TABLET | Refills: 0 | Status: SHIPPED | OUTPATIENT
Start: 2021-11-18 | End: 2021-12-17

## 2021-11-18 NOTE — TELEPHONE ENCOUNTER
Reason for Call:  Medication or medication refill:    Do you use a [facility]?  Name of the pharmacy and phone number for the current request:  [id number] - [place], [place] - [address]    Name of the medication requested: Adderall    Other request: refill request    Can we leave a detailed message on this number? YES    Phone number patient can be reached at: Home number on file [phone] (home)    Best Time: any    Call taken on 11/18/2021 at 2:55 PM by Devon James

## 2021-12-17 ENCOUNTER — TELEPHONE (OUTPATIENT)
Dept: FAMILY MEDICINE | Facility: CLINIC | Age: 31
End: 2021-12-17
Payer: COMMERCIAL

## 2021-12-17 DIAGNOSIS — F90.9 ATTENTION DEFICIT HYPERACTIVITY DISORDER (ADHD), UNSPECIFIED ADHD TYPE: ICD-10-CM

## 2021-12-17 RX ORDER — DEXTROAMPHETAMINE SACCHARATE, AMPHETAMINE ASPARTATE, DEXTROAMPHETAMINE SULFATE AND AMPHETAMINE SULFATE 7.5; 7.5; 7.5; 7.5 MG/1; MG/1; MG/1; MG/1
30 TABLET ORAL 2 TIMES DAILY
Qty: 60 TABLET | Refills: 0 | Status: SHIPPED | OUTPATIENT
Start: 2021-12-17 | End: 2022-01-13

## 2022-01-13 DIAGNOSIS — F90.9 ATTENTION DEFICIT HYPERACTIVITY DISORDER (ADHD), UNSPECIFIED ADHD TYPE: ICD-10-CM

## 2022-01-13 RX ORDER — DEXTROAMPHETAMINE SACCHARATE, AMPHETAMINE ASPARTATE, DEXTROAMPHETAMINE SULFATE AND AMPHETAMINE SULFATE 7.5; 7.5; 7.5; 7.5 MG/1; MG/1; MG/1; MG/1
30 TABLET ORAL 2 TIMES DAILY
Qty: 60 TABLET | Refills: 0 | Status: SHIPPED | OUTPATIENT
Start: 2022-01-13 | End: 2022-02-21

## 2022-01-13 NOTE — TELEPHONE ENCOUNTER
.Reason for Call:  Medication or medication refill:    Do you use a [facility]?  Name of the pharmacy and phone number for the current request: [facility][id number] - [place], [place] - [address]    Name of the medication requested:   amphetamine-dextroamphetamine (ADDERALL) 30 MG tablet    Other request: NA    Can we leave a detailed message on this number? YES    Phone number patient can be reached at: Home number on file [phone] (home)    Best Time: Any    Call taken on 1/13/2022 at 9:54 AM by Mary Chaves

## 2022-02-16 DIAGNOSIS — F90.9 ATTENTION DEFICIT HYPERACTIVITY DISORDER (ADHD), UNSPECIFIED ADHD TYPE: ICD-10-CM

## 2022-02-17 NOTE — TELEPHONE ENCOUNTER
Patient would like a refill on their medication adderall. If you have any question please call patient back at [phone].

## 2022-02-18 NOTE — TELEPHONE ENCOUNTER
Reason for Call:  Medication or medication refill:    Do you use a [facility]?  Name of the pharmacy and phone number for the current request:  [facility] [address]    Name of the medication requested: Adderall    Other request: PT originally requested on 2/16    Can we leave a detailed message on this number? YES    Phone number patient can be reached at: Cell number on file:    Telephone Information:   Mobile [phone]       Best Time: Anytime      Call taken on 2/18/2022 at 5:29 PM by Jessenia Box

## 2022-02-21 ENCOUNTER — TELEPHONE (OUTPATIENT)
Dept: FAMILY MEDICINE | Facility: CLINIC | Age: 32
End: 2022-02-21
Payer: COMMERCIAL

## 2022-02-21 RX ORDER — DEXTROAMPHETAMINE SACCHARATE, AMPHETAMINE ASPARTATE, DEXTROAMPHETAMINE SULFATE AND AMPHETAMINE SULFATE 7.5; 7.5; 7.5; 7.5 MG/1; MG/1; MG/1; MG/1
30 TABLET ORAL 2 TIMES DAILY
Qty: 60 TABLET | Refills: 0 | Status: SHIPPED | OUTPATIENT
Start: 2022-02-21 | End: 2022-03-18

## 2022-02-21 NOTE — TELEPHONE ENCOUNTER
Patient calling to be certain her Adderall prescription has been sent to the pharmacy.  Prescription was sent this morning 2/21/22, 9:30 am .  Patient will follow up with the pharmacy. Verbalized good understanding.   Angela Molina RN

## 2022-03-18 DIAGNOSIS — F90.9 ATTENTION DEFICIT HYPERACTIVITY DISORDER (ADHD), UNSPECIFIED ADHD TYPE: ICD-10-CM

## 2022-03-18 RX ORDER — DEXTROAMPHETAMINE SACCHARATE, AMPHETAMINE ASPARTATE, DEXTROAMPHETAMINE SULFATE AND AMPHETAMINE SULFATE 7.5; 7.5; 7.5; 7.5 MG/1; MG/1; MG/1; MG/1
30 TABLET ORAL 2 TIMES DAILY
Qty: 60 TABLET | Refills: 0 | Status: SHIPPED | OUTPATIENT
Start: 2022-03-22 | End: 2022-04-18

## 2022-03-18 NOTE — TELEPHONE ENCOUNTER
Reason for Call:  Medication or medication refill:    Do you use a [facility]?  Name of the pharmacy and phone number for the current request:  [facility] #[id number] - [place], [place] - [address]    Name of the medication requested: amphetamine-dextroamphetamine (ADDERALL) 30 MG tablet    Other request: has one day of medication left, will be out before Monday. PCP is out of office today. Please advise.     Can we leave a detailed message on this number? YES    Phone number patient can be reached at: Cell number on file:    Telephone Information:   Mobile [phone]       Best Time: any    Call taken on 3/18/2022 at 7:47 AM by Gokul Leavitt

## 2022-03-21 ENCOUNTER — TELEPHONE (OUTPATIENT)
Dept: FAMILY MEDICINE | Facility: CLINIC | Age: 32
End: 2022-03-21
Payer: COMMERCIAL

## 2022-03-21 NOTE — TELEPHONE ENCOUNTER
Patient is following up on the refill of amphetamine-dextroamphetamine (ADDERALL) 30 MG tablet.     Nursing informed: Patient was informed that the above mediation was sent to [id number] - [place], [place] - [address] to be filled 3/22/2022. Patient verbalizes good understanding, agrees with plan and states she needs no further support. Stephanie Ruff R.N.

## 2022-04-18 ENCOUNTER — TELEPHONE (OUTPATIENT)
Dept: FAMILY MEDICINE | Facility: CLINIC | Age: 32
End: 2022-04-18
Payer: COMMERCIAL

## 2022-04-18 DIAGNOSIS — F90.9 ATTENTION DEFICIT HYPERACTIVITY DISORDER (ADHD), UNSPECIFIED ADHD TYPE: ICD-10-CM

## 2022-04-18 RX ORDER — DEXTROAMPHETAMINE SACCHARATE, AMPHETAMINE ASPARTATE, DEXTROAMPHETAMINE SULFATE AND AMPHETAMINE SULFATE 7.5; 7.5; 7.5; 7.5 MG/1; MG/1; MG/1; MG/1
30 TABLET ORAL 2 TIMES DAILY
Qty: 60 TABLET | Refills: 0 | Status: SHIPPED | OUTPATIENT
Start: 2022-04-18 | End: 2022-05-18

## 2022-04-18 NOTE — TELEPHONE ENCOUNTER
Reason for Call:  Medication or medication refill: adderol     Do you use a [facility]?  Name of the pharmacy and phone number for the current request: [facility] [address] [place] [address] [phone]    Name of the medication requested: Adderol     Other request: Pt has a few days left, needs refill by end of week     Can we leave a detailed message on this number? YES    Phone number patient can be reached at: Home number on file [phone] (home)    Best Time: ANY    Call taken on 4/18/2022 at 5:22 PM by Melvina Multani

## 2022-04-18 NOTE — TELEPHONE ENCOUNTER
Routing refill request to provider for review/approval because:  Drug or supplies are not on the [facility] refill protocol     Asiya SOLN, RN

## 2022-05-16 DIAGNOSIS — F90.9 ATTENTION DEFICIT HYPERACTIVITY DISORDER (ADHD), UNSPECIFIED ADHD TYPE: ICD-10-CM

## 2022-05-16 RX ORDER — DEXTROAMPHETAMINE SACCHARATE, AMPHETAMINE ASPARTATE, DEXTROAMPHETAMINE SULFATE AND AMPHETAMINE SULFATE 7.5; 7.5; 7.5; 7.5 MG/1; MG/1; MG/1; MG/1
30 TABLET ORAL 2 TIMES DAILY
Qty: 60 TABLET | Refills: 0 | OUTPATIENT
Start: 2022-05-16

## 2022-05-16 NOTE — TELEPHONE ENCOUNTER
Reason for Call:  Medication or medication refill:amphetamine-dextroamphetamine (ADDERALL) 30 MG tablet  Do you use a [facility]?  Name of the pharmacy and phone number for the current request:  [facility] in [place], [phone]    Name of the medication requested: amphetamine-dextroamphetamine (ADDERALL) 30 MG tablet    Other request: Patient stated that she has to call her provider every month to get a refill and not the pharmacy.     Can we leave a detailed message on this number? YES    Phone number patient can be reached at: Cell number on file:    Telephone Information:   Mobile [phone]       Best Time: any    Call taken on 5/16/2022 at 2:35 PM by Shireen Manning

## 2022-05-18 RX ORDER — DEXTROAMPHETAMINE SACCHARATE, AMPHETAMINE ASPARTATE, DEXTROAMPHETAMINE SULFATE AND AMPHETAMINE SULFATE 7.5; 7.5; 7.5; 7.5 MG/1; MG/1; MG/1; MG/1
30 TABLET ORAL 2 TIMES DAILY
Qty: 60 TABLET | Refills: 0 | Status: SHIPPED | OUTPATIENT
Start: 2022-05-18 | End: 2022-06-15

## 2022-05-18 NOTE — TELEPHONE ENCOUNTER
Called and spoke to patient, appointment made 6/16/22. Please send refill.Patricia Palacios MA/BIJAN

## 2022-05-18 NOTE — TELEPHONE ENCOUNTER
Pt needs to have appointment scheduled before further refills.   She is overdue.     Katharine Montalvo MD

## 2022-06-13 NOTE — PROGRESS NOTES
"  Assessment & Plan     Attention deficit hyperactivity disorder (ADHD), unspecified ADHD type  Well controlled on meds  - amphetamine-dextroamphetamine (ADDERALL) 30 MG tablet; Take 1 tablet (30 mg) by mouth 2 times daily One tablet first thing in the morning, one 4-5 hours later.             Tobacco Cessation:   reports that she has been smoking. She has been smoking about 0.00 packs per day. She has never used smokeless tobacco.  Tobacco Cessation Action Plan: Information offered: Patient not interested at this time    BMI:   Estimated body mass index is 41 kg/m  as calculated from the following:    Height as of this encounter: 1.676 m (5' 6\").    Weight as of this encounter: 115.2 kg (254 lb).   Weight management plan: Discussed healthy diet and exercise guidelines        No follow-ups on file.    Katharine Montalvo MD  [facility]    Camille Jacinto is a 32 year old who presents for the following health issues     History of Present Illness       Reason for visit:  Med check    She eats 2-3 servings of fruits and vegetables daily.She consumes 2 sweetened beverage(s) daily.She exercises with enough effort to increase her heart rate 60 or more minutes per day.  She exercises with enough effort to increase her heart rate 6 days per week.   She is taking medications regularly.     Pt with ADHD, doing well with meds  Takes them daily unless she is sleeping the day away  Denies any tics  BP at goal. Weight not decreased  No concerns   has been reviewed        Review of Systems   Constitutional, HEENT, cardiovascular, pulmonary, gi and gu systems are negative, except as otherwise noted.      Objective    /80   Pulse 80   Temp 97.9  F (36.6  C) (Tympanic)   Resp 18   Ht 1.676 m (5' 6\")   Wt 115.2 kg (254 lb)   LMP 05/21/2022   SpO2 97%   BMI 41.00 kg/m    Body mass index is 41 kg/m .  Physical Exam   GENERAL: healthy, alert and no distress  RESP: lungs clear to auscultation " - no rales, rhonchi or wheezes  CV: regular rate and rhythm, normal S1 S2, no S3 or S4, no murmur, click or rub, no peripheral edema and peripheral pulses strong    No results found for this or any previous visit (from the past 24 hour(s)).

## 2022-06-15 ENCOUNTER — OFFICE VISIT (OUTPATIENT)
Dept: FAMILY MEDICINE | Facility: CLINIC | Age: 32
End: 2022-06-15
Payer: COMMERCIAL

## 2022-06-15 VITALS
OXYGEN SATURATION: 97 % | RESPIRATION RATE: 18 BRPM | HEIGHT: 66 IN | SYSTOLIC BLOOD PRESSURE: 136 MMHG | HEART RATE: 80 BPM | BODY MASS INDEX: 40.82 KG/M2 | DIASTOLIC BLOOD PRESSURE: 80 MMHG | TEMPERATURE: 97.9 F | WEIGHT: 254 LBS

## 2022-06-15 DIAGNOSIS — F90.9 ATTENTION DEFICIT HYPERACTIVITY DISORDER (ADHD), UNSPECIFIED ADHD TYPE: Primary | ICD-10-CM

## 2022-06-15 PROCEDURE — 99213 OFFICE O/P EST LOW 20 MIN: CPT | Performed by: FAMILY MEDICINE

## 2022-06-15 RX ORDER — DEXTROAMPHETAMINE SACCHARATE, AMPHETAMINE ASPARTATE, DEXTROAMPHETAMINE SULFATE AND AMPHETAMINE SULFATE 7.5; 7.5; 7.5; 7.5 MG/1; MG/1; MG/1; MG/1
30 TABLET ORAL 2 TIMES DAILY
Qty: 60 TABLET | Refills: 0 | Status: SHIPPED | OUTPATIENT
Start: 2022-06-15 | End: 2022-07-11

## 2022-06-15 ASSESSMENT — PAIN SCALES - GENERAL: PAINLEVEL: MILD PAIN (3)

## 2022-06-15 NOTE — NURSING NOTE
"Chief Complaint   Patient presents with     A.D.H.D       Initial /80   Pulse 80   Temp 97.9  F (36.6  C) (Tympanic)   Resp 18   Ht 1.676 m (5' 6\")   Wt 115.2 kg (254 lb)   LMP 05/21/2022   SpO2 97%   BMI 41.00 kg/m   Estimated body mass index is 41 kg/m  as calculated from the following:    Height as of this encounter: 1.676 m (5' 6\").    Weight as of this encounter: 115.2 kg (254 lb).  Medication Reconciliation: complete  Pennie Messina CMA  "

## 2022-07-11 ENCOUNTER — TELEPHONE (OUTPATIENT)
Dept: FAMILY MEDICINE | Facility: CLINIC | Age: 32
End: 2022-07-11

## 2022-07-11 DIAGNOSIS — F90.9 ATTENTION DEFICIT HYPERACTIVITY DISORDER (ADHD), UNSPECIFIED ADHD TYPE: ICD-10-CM

## 2022-07-11 RX ORDER — DEXTROAMPHETAMINE SACCHARATE, AMPHETAMINE ASPARTATE, DEXTROAMPHETAMINE SULFATE AND AMPHETAMINE SULFATE 7.5; 7.5; 7.5; 7.5 MG/1; MG/1; MG/1; MG/1
30 TABLET ORAL 2 TIMES DAILY
Qty: 60 TABLET | Refills: 0 | Status: SHIPPED | OUTPATIENT
Start: 2022-07-11 | End: 2022-08-08

## 2022-07-11 NOTE — TELEPHONE ENCOUNTER
Reason for Call:  Medication or medication refill:    Do you use a [facility]?  Name of the pharmacy and phone number for the current request: [facility] [phone]    Name of the medication requested: adderall    Other request: no    Can we leave a detailed message on this number? YES    Phone number patient can be reached at: Cell number on file:    Telephone Information:   Mobile [phone]       Best Time: any    Call taken on 7/11/2022 at 2:31 PM by Zakiya Barry

## 2022-07-28 ENCOUNTER — TELEPHONE (OUTPATIENT)
Dept: OBGYN | Facility: CLINIC | Age: 32
End: 2022-07-28

## 2022-07-28 NOTE — TELEPHONE ENCOUNTER
[facility] Call Center    Phone Message    May a detailed message be left on voicemail: yes     Reason for Call: Other: Ophelia called to set up initial prenatal.  This is not her first pregnancy, but it has been a little while since her last child.  She is not entirely sure of the first day of her last menstrual cycle as she is irregular.  It was either mid-May or mid-June, eitherway the initial appointment will have her at least at the 10 week point or later.  Delivery hospital is tentatively [facility] as that is closest to where she lives.  Please call her to discuss any questions or concerns.    Action Taken: Message routed to:  [phone]    Travel Screening: Not Applicable

## 2022-07-28 NOTE — TELEPHONE ENCOUNTER
Attempted to contact patient to discuss message below. If patient wants to deliver at [facility] then she should schedule with Magri otherwise we should notify her that our providers only deliver at [facility].    Unable to leave message as no voice mailbox set-up.    Mick Miranda, [facility]

## 2022-08-01 NOTE — TELEPHONE ENCOUNTER
Attempted to call pt. No answer and VM box has not been set up yet. Unable to leave message.  Mallika Kamara MA

## 2022-08-02 NOTE — TELEPHONE ENCOUNTER
Spoke with patient and she plans to deliver her baby at [facility]. Referred her to [facility] providers.  Carmina Joyner CMA

## 2022-08-08 ENCOUNTER — TELEPHONE (OUTPATIENT)
Dept: FAMILY MEDICINE | Facility: CLINIC | Age: 32
End: 2022-08-08

## 2022-08-08 DIAGNOSIS — F90.9 ATTENTION DEFICIT HYPERACTIVITY DISORDER (ADHD), UNSPECIFIED ADHD TYPE: ICD-10-CM

## 2022-08-08 RX ORDER — DEXTROAMPHETAMINE SACCHARATE, AMPHETAMINE ASPARTATE, DEXTROAMPHETAMINE SULFATE AND AMPHETAMINE SULFATE 7.5; 7.5; 7.5; 7.5 MG/1; MG/1; MG/1; MG/1
30 TABLET ORAL 2 TIMES DAILY
Qty: 60 TABLET | Refills: 0 | Status: SHIPPED | OUTPATIENT
Start: 2022-08-08 | End: 2022-09-06

## 2022-08-08 NOTE — TELEPHONE ENCOUNTER
Reason for Call:  Medication or medication refill:    Do you use a [facility]?  Name of the pharmacy and phone number for the current request:  [id number] - [place], [place] - [address]    Name of the medication requested: Adderall    Other request: N/A    Can we leave a detailed message on this number? YES    Phone number patient can be reached at: Home number on file [phone] (home)    Best Time: Anytime    Call taken on 8/8/2022 at 12:10 PM by Hilario Banda

## 2022-08-25 ENCOUNTER — TELEPHONE (OUTPATIENT)
Dept: FAMILY MEDICINE | Facility: CLINIC | Age: 32
End: 2022-08-25

## 2022-08-25 NOTE — TELEPHONE ENCOUNTER
Voice Mailbox not set-up. Unable to leave message. Patient should schedule 1st OB appointment 10-12 weeks from [facility].    Mick Miranda CMA

## 2022-08-25 NOTE — TELEPHONE ENCOUNTER
Pt has a positive home pregnancy test. She wants to schedule a first OB  appt with  a  at [place]. Please call her back after 10am.

## 2022-08-25 NOTE — TELEPHONE ENCOUNTER
Tried calling patient again. No answer, unable to leave voicemail.     Constance FRAGOSO 8/25/2022 4:07 PM

## 2022-09-01 ENCOUNTER — TELEPHONE (OUTPATIENT)
Dept: OBGYN | Facility: CLINIC | Age: 32
End: 2022-09-01

## 2022-09-01 NOTE — TELEPHONE ENCOUNTER
Reason for Call:  Other appointment    Detailed comments: Patient is 13 or 14 weeks pregnant and would like to schedule her first prenatal appt in the [facility].     Phone Number Patient can be reached at: Home number on file [phone] (home)    Best Time: Patient works overnights and would like someone to call anytime after 3.    Can we leave a detailed message on this number? YES    Call taken on 9/1/2022 at 6:11 PM by Roma Ivy

## 2022-09-02 NOTE — TELEPHONE ENCOUNTER
Reviewing providers schedules, there are no available appointments in [place] until the end of September.     Are any of you able to work patient in anywhere, as she is approx. 13-14 weeks and has not had prenatal care?    (Also MA/RN - when calling, please confirm she wants delivery at . Previous encounter states that she wanted to deliver at [facility].)

## 2022-09-06 DIAGNOSIS — F90.9 ATTENTION DEFICIT HYPERACTIVITY DISORDER (ADHD), UNSPECIFIED ADHD TYPE: ICD-10-CM

## 2022-09-06 RX ORDER — DEXTROAMPHETAMINE SACCHARATE, AMPHETAMINE ASPARTATE, DEXTROAMPHETAMINE SULFATE AND AMPHETAMINE SULFATE 7.5; 7.5; 7.5; 7.5 MG/1; MG/1; MG/1; MG/1
30 TABLET ORAL 2 TIMES DAILY
Qty: 60 TABLET | Refills: 0 | Status: SHIPPED | OUTPATIENT
Start: 2022-09-06 | End: 2022-09-07

## 2022-09-06 NOTE — TELEPHONE ENCOUNTER
Is patient willing to come to [facility] just for the initial appointment? If so, I can see her 9/14 at 9:30 (please have her arrive at 9:15 to complete questionnaires). Visits going forward could be at [place] if desired. Also as MA mentioned, please confirm preferred delivery hospital. If she wants to go to [facility], should establish care with group that delivers there. Samantha YUSUF CNP

## 2022-09-06 NOTE — TELEPHONE ENCOUNTER
Attempted to contact patient - no answer/no voicemail set up. If she calls back, please see Samantha's note from below.     I did hold the spot on her schedule in case this works for patient. If not, please let her know most providers are booked until end of September/early October.    Also, please clarify where she wants delivery.

## 2022-09-07 RX ORDER — DEXTROAMPHETAMINE SACCHARATE, AMPHETAMINE ASPARTATE, DEXTROAMPHETAMINE SULFATE AND AMPHETAMINE SULFATE 7.5; 7.5; 7.5; 7.5 MG/1; MG/1; MG/1; MG/1
30 TABLET ORAL 2 TIMES DAILY
Qty: 60 TABLET | Refills: 0 | Status: SHIPPED | OUTPATIENT
Start: 2022-09-07 | End: 2022-09-08

## 2022-09-07 NOTE — TELEPHONE ENCOUNTER
Another attempt to contact patient. No future appointment is scheduled.     There is a hold on schedule for Samantha in [place] next Wednesday at 9:30 am. Arrive at 9:15 am.    Mick Miranda CMA

## 2022-09-07 NOTE — TELEPHONE ENCOUNTER
Pharmacy is out of stock, requesting this be sent to [facility] on [address], pharmacy pended    Ray Quiroz

## 2022-09-08 ENCOUNTER — TELEPHONE (OUTPATIENT)
Dept: FAMILY MEDICINE | Facility: CLINIC | Age: 32
End: 2022-09-08

## 2022-09-08 DIAGNOSIS — F90.9 ATTENTION DEFICIT HYPERACTIVITY DISORDER (ADHD), UNSPECIFIED ADHD TYPE: ICD-10-CM

## 2022-09-08 RX ORDER — DEXTROAMPHETAMINE SACCHARATE, AMPHETAMINE ASPARTATE, DEXTROAMPHETAMINE SULFATE AND AMPHETAMINE SULFATE 7.5; 7.5; 7.5; 7.5 MG/1; MG/1; MG/1; MG/1
30 TABLET ORAL 2 TIMES DAILY
Qty: 60 TABLET | Refills: 0 | Status: SHIPPED | OUTPATIENT
Start: 2022-09-08 | End: 2022-10-06

## 2022-09-08 NOTE — TELEPHONE ENCOUNTER
Multiple attempts to contact patient to offer appointments and she has not returned call to clinic and does not have any future appointments scheduled.    Will close encounter.    Mick Miranda, CMA

## 2022-09-08 NOTE — TELEPHONE ENCOUNTER
Medication Question or Refill    Contacts       Type Contact Phone/Fax    09/08/2022 09:30 AM CDT Phone (Incoming) Ophelia Stratton (Self) [phone] (H)          What medication are you calling about (include dose and sig)?: amphetamine-dextroamphetamine (ADDERALL) 30 MG tablet    Controlled Substance Agreement on file:   CSA -- Patient Level:    Controlled Substance Agreement - Non - Opioid - Scan on 11/5/2019  5:52 PM: NON-OPIOID CONTROLLED SUBSTANCE AGREEMENT       Who prescribed the medication?: Dr Portillo    Do you need a refill? Yes: The pharmacy she usually uses, does not have adderall, it is on back order. She wants it sent to a different pharmacy.    When did you use the medication last?     Patient offered an appointment? No    Do you have any questions or concerns?  No    Preferred Pharmacy:     [id number] - [place] - [address]  [address] [place] [address]  Phone: [phone] Fax: [phone]      Okay to leave a detailed message?: Yes at Home number on file [phone] (home)    Patricia Palacios MA/BIJAN

## 2022-10-06 DIAGNOSIS — F90.9 ATTENTION DEFICIT HYPERACTIVITY DISORDER (ADHD), UNSPECIFIED ADHD TYPE: ICD-10-CM

## 2022-10-06 RX ORDER — DEXTROAMPHETAMINE SACCHARATE, AMPHETAMINE ASPARTATE, DEXTROAMPHETAMINE SULFATE AND AMPHETAMINE SULFATE 7.5; 7.5; 7.5; 7.5 MG/1; MG/1; MG/1; MG/1
30 TABLET ORAL 2 TIMES DAILY
Qty: 60 TABLET | Refills: 0 | Status: SHIPPED | OUTPATIENT
Start: 2022-10-06 | End: 2022-11-02

## 2022-10-06 NOTE — TELEPHONE ENCOUNTER
Reason for Call:  Medication or medication refill:    Do you use a [facility]?  Name of the pharmacy and phone number for the current request:  [facility] [address] [facility] [address]    Name of the medication requested: amphetamine-dextroamphetamine (ADDERALL) 30 MG tablet    Other request: medication originally sent to different pharmacy but patient is requesting medication get sent to this one listed on encounter.     Can we leave a detailed message on this number? YES    Phone number patient can be reached at: Home number on file [phone] (home)    Best Time: any    Call taken on 10/6/2022 at 12:25 PM by Helen Reeder

## 2022-10-19 ENCOUNTER — TELEPHONE (OUTPATIENT)
Dept: FAMILY MEDICINE | Facility: CLINIC | Age: 32
End: 2022-10-19

## 2022-10-19 NOTE — TELEPHONE ENCOUNTER
Pt need a new prenatal appt with OB/gyn. She has a positive home pregnancy test.Please call her after 3pm.

## 2022-10-19 NOTE — TELEPHONE ENCOUNTER
Attempted to contact patient, no answer and no voicemail.  If patient calls back please help schedule for a prenatal intake and a new prenatal with a provider around 8-10 weeks Gestation    Will try back later.    Palak Moore, [facility]  October 19, 2022

## 2022-10-21 NOTE — TELEPHONE ENCOUNTER
There is no dial tone to phone number. Unable to contact patient.    Mick Miranda, [facility]

## 2022-10-24 NOTE — TELEPHONE ENCOUNTER
Third attempt to contact patient. No answer and voicemail not set up.   Closing encounter since many attempts made. If call is returned please assist with scheduling intake & appointment 10-12 weeks gestation    Hannah Romero MA

## 2022-11-02 DIAGNOSIS — F90.9 ATTENTION DEFICIT HYPERACTIVITY DISORDER (ADHD), UNSPECIFIED ADHD TYPE: ICD-10-CM

## 2022-11-02 RX ORDER — DEXTROAMPHETAMINE SACCHARATE, AMPHETAMINE ASPARTATE, DEXTROAMPHETAMINE SULFATE AND AMPHETAMINE SULFATE 7.5; 7.5; 7.5; 7.5 MG/1; MG/1; MG/1; MG/1
30 TABLET ORAL 2 TIMES DAILY
Qty: 60 TABLET | Refills: 0 | Status: SHIPPED | OUTPATIENT
Start: 2022-11-02 | End: 2023-02-05

## 2022-11-22 ENCOUNTER — PRENATAL OFFICE VISIT (OUTPATIENT)
Dept: OBGYN | Facility: CLINIC | Age: 32
End: 2022-11-22
Payer: COMMERCIAL

## 2022-11-22 ENCOUNTER — TELEPHONE (OUTPATIENT)
Dept: OBGYN | Facility: CLINIC | Age: 32
End: 2022-11-22

## 2022-11-22 DIAGNOSIS — F11.20 BUPRENORPHINE MAINTENANCE TREATMENT AFFECTING PREGNANCY IN THIRD TRIMESTER (H): Primary | ICD-10-CM

## 2022-11-22 DIAGNOSIS — Z23 NEED FOR TDAP VACCINATION: ICD-10-CM

## 2022-11-22 DIAGNOSIS — O99.323 BUPRENORPHINE MAINTENANCE TREATMENT AFFECTING PREGNANCY IN THIRD TRIMESTER (H): Primary | ICD-10-CM

## 2022-11-22 PROBLEM — T74.11XA: Status: ACTIVE | Noted: 2020-05-07

## 2022-11-22 PROBLEM — G89.4 CHRONIC PAIN DISORDER: Status: ACTIVE | Noted: 2020-05-13

## 2022-11-22 PROBLEM — M47.817 LUMBOSACRAL SPONDYLOSIS WITHOUT MYELOPATHY: Status: ACTIVE | Noted: 2020-01-14

## 2022-11-22 PROBLEM — Z91.148 CONTROLLED SUBSTANCE AGREEMENT BROKEN: Status: ACTIVE | Noted: 2019-07-16

## 2022-11-22 PROBLEM — F45.42 PAIN DISORDER ASSOCIATED WITH PSYCHOLOGICAL FACTORS: Status: ACTIVE | Noted: 2020-01-14

## 2022-11-22 PROBLEM — Z20.822 SUSPECTED SEVERE ACUTE RESPIRATORY SYNDROME CORONAVIRUS 2 (SARS-COV-2) INFECTION: Status: ACTIVE | Noted: 2020-12-07

## 2022-11-22 PROCEDURE — 99207 PR NO CHARGE NURSE ONLY: CPT

## 2022-11-22 RX ORDER — BUPRENORPHINE 8 MG/1
TABLET SUBLINGUAL
COMMUNITY

## 2022-11-22 RX ORDER — PRENATAL VIT/IRON FUM/FOLIC AC 27MG-0.8MG
1 TABLET ORAL DAILY
COMMUNITY
End: 2023-09-14

## 2022-11-22 RX ORDER — BUPRENORPHINE 8 MG/1
TABLET SUBLINGUAL
COMMUNITY
Start: 2022-11-15 | End: 2023-08-22

## 2022-11-22 RX ORDER — BUPRENORPHINE 2 MG/1
TABLET SUBLINGUAL
COMMUNITY
Start: 2021-12-02 | End: 2023-04-04

## 2022-11-22 NOTE — TELEPHONE ENCOUNTER
RN attempted to call pt to relay US orders placed and to call [phone] to schedule.    Pt did not answer and VM is not set up. Will try again later.    Katie Simpson RN on 11/22/2022 at 10:28 AM

## 2022-11-22 NOTE — PROGRESS NOTES
Telephone visit with patient for New Prenatal Intake and Education. This is patient's fifth pregnancy. Handouts reviewed and will be provided at next prenatal appointment. Scheduled for New Prenatal with Samantha YUSUF CNP on 11/28/2022.     Pt has hx of late prenatal care, has not been seen yet this pregnancy besides early US at a free clinic in [place]. Pt currently on adderall and buprenorphine. Pt reports no symptoms/concerns-currently feeling reassuring fetal movement. Unsure where she wants to deliver- RN gave run down of where providers deliver.      Prenatal OB Questionnaire  Patient supplied answers from flow sheet for:  Prenatal OB Questionnaire.  Past Medical History  Have you ever recieved care for your mental health? : No  Have you ever been in a major accident or suffered serious trauma?: No  Within the last year, has anyone hit, slapped, kicked or otherwise hurt you?: No  In the last year, has anyone forced you to have sex when you didn't want to?: No    Past Medical History 2   Have you ever received a blood transfusion?: No  Would you accept a blood transfusion if was medically recommended?: Yes  Does anyone in your home smoke?: (!) Yes (pt smokes)   Is your blood type Rh negative?: Unknown  Have you ever ?: (!) Yes (with both)  Have you been hospitalized for a nonsurgical reason excluding normal delivery?: No  Have you ever had an abnormal pap smear?: No    Past Medical History (Continued)  Do you have a history of abnormalities of the uterus?: No  Did your mother take VERA or any other hormones when she was pregnant with you?: No  Do you have any other problems we have not asked about which you feel may be important to this pregnancy?: No    PHQ-2 Score:     PHQ-2 ( 1999 [employer]) 11/22/2022 6/15/2022   Q1: Little interest or pleasure in doing things 0 0   Q2: Feeling down, depressed or hopeless 0 0   PHQ-2 Score 0 0   PHQ-2 Total Score (12-17 Years)- Positive if 3 or more points;  Administer PHQ-A if positive - -   Q1: Little interest or pleasure in doing things - Not at all   Q2: Feeling down, depressed or hopeless - Not at all   PHQ-2 Score - 0       Allergies as of 11/22/2022:    Allergies as of 11/22/2022     (No Known Allergies)       Current medications are:  Current Outpatient Medications   Medication Sig Dispense Refill     amphetamine-dextroamphetamine (ADDERALL) 30 MG tablet Take 1 tablet (30 mg) by mouth 2 times daily One tablet first thing in the morning, one 4-5 hours later. 60 tablet 0     buprenorphine (SUBUTEX) 2 MG SUBL sublingual tablet PLACE 2 TABLETS TWICE DAILY BY SUBLINGUAL ROUTE AS DIRECTED FOR 30 DAYS       buprenorphine (SUBUTEX) 8 MG SUBL sublingual tablet DISSOLVE 1 TABLET UNDER THE TONGUE EVERY MORNING AND DISSOLVE 1 TABLET UNDER THE TONGUE EVERY EVENING. TAKE 1/2 TABLET UNDER THE TONGUE AT BEDTIME       Prenatal Vit-Fe Fumarate-FA (PRENATAL MULTIVITAMIN W/IRON) 27-0.8 MG tablet Take 1 tablet by mouth daily       Acetaminophen (TYLENOL PO) Take 500 mg by mouth every 8 hours as needed for mild pain or fever (Patient not taking: Reported on 11/22/2022)       buprenorphine (SUBUTEX) 8 MG SUBL sublingual tablet buprenorphine HCl 8 mg sublingual tablet   Use one tab in the morning, one tab in the evening and half tab at bedtime. (Patient not taking: Reported on 11/22/2022)       Buprenorphine HCl (BELBUCA) 750 MCG FILM buccal film [place] 750 mcg buccal film   PLACE 1 FILM IN CHEEK TWICE DAILY AS DIRECTED (Patient not taking: Reported on 11/22/2022)           Early ultrasound screening tool:    Does patient have irregular periods?  Yes  Did patient use hormonal birth control in the three months prior to positive urine pregnancy test? No  Is the patient breastfeeding?  No  Is the patient 10 weeks or greater at time of education visit?  Yes     Katie Simpson RN on 11/22/2022 at 9:21 AM

## 2022-11-23 NOTE — TELEPHONE ENCOUNTER
RN attempted to call pt to relay US orders placed and to call [phone] to schedule.     Pt did not answer and VM is not set up. Will try again later.    Katie Simpson RN on 11/23/2022 at 8:51 AM

## 2022-11-28 NOTE — TELEPHONE ENCOUNTER
Pt has appt scheduled today, RN put in notes to have pt schedule US since we have been unable to reach her.    Katie Simpson RN on 11/28/2022 at 8:56 AM

## 2022-12-28 ENCOUNTER — TELEPHONE (OUTPATIENT)
Dept: FAMILY MEDICINE | Facility: CLINIC | Age: 32
End: 2022-12-28

## 2022-12-28 DIAGNOSIS — F90.9 ATTENTION DEFICIT HYPERACTIVITY DISORDER (ADHD), UNSPECIFIED ADHD TYPE: ICD-10-CM

## 2022-12-28 RX ORDER — DEXTROAMPHETAMINE SACCHARATE, AMPHETAMINE ASPARTATE, DEXTROAMPHETAMINE SULFATE AND AMPHETAMINE SULFATE 7.5; 7.5; 7.5; 7.5 MG/1; MG/1; MG/1; MG/1
30 TABLET ORAL 2 TIMES DAILY
Qty: 60 TABLET | Refills: 0 | OUTPATIENT
Start: 2022-12-28

## 2022-12-28 NOTE — TELEPHONE ENCOUNTER
Reason for Call:  Prescription    Detailed comments: Pt req refill on Adderall for today or tomorrow. Please see note about refill dated 12/28    Phone Number Patient can be reached at: Cell number on file:    Telephone Information:   Mobile [phone]       Best Time: Anytime    Can we leave a detailed message on this number? YES    Call taken on 12/28/2022 at 4:53 PM by Gaye Malcolm

## 2022-12-29 NOTE — TELEPHONE ENCOUNTER
Future Appointments   Date Time Provider Department Center   1/2/2023  1:00 PM [facility]   1/3/2023 10:00 AM Katharine Montalvo MD [facility]   1/17/2023  1:30 PM Kaye Gaming DO [facility]      patient is scheduled.  Helen Reeder,     [facility]

## 2022-12-29 NOTE — TELEPHONE ENCOUNTER
She is overdue for visit plus she no-showed her OB appt  Need to discuss adderall in pregnancy.   Okay to put her in ANNEL slot next week    Katharine Montalvo MD

## 2022-12-29 NOTE — TELEPHONE ENCOUNTER
Patient is scheduled with OB. Did you still want me to schedule her with you?  Future Appointments   Date Time Provider Department Center   1/2/2023  1:00 PM German Retana MD [facility] [place] [facility]   1/17/2023  1:30 PM Kaye Gaming DO [facility] [place] [facility]     [facility]

## 2023-01-01 LAB
ABO/RH(D): NORMAL
ANTIBODY SCREEN: NEGATIVE
SPECIMEN EXPIRATION DATE: NORMAL

## 2023-01-02 ENCOUNTER — PRENATAL OFFICE VISIT (OUTPATIENT)
Dept: OBGYN | Facility: CLINIC | Age: 33
End: 2023-01-02
Payer: COMMERCIAL

## 2023-01-02 VITALS
HEIGHT: 67 IN | DIASTOLIC BLOOD PRESSURE: 84 MMHG | BODY MASS INDEX: 41.06 KG/M2 | SYSTOLIC BLOOD PRESSURE: 139 MMHG | OXYGEN SATURATION: 98 % | HEART RATE: 87 BPM | WEIGHT: 261.6 LBS

## 2023-01-02 DIAGNOSIS — O09.30 LATE PRENATAL CARE AFFECTING PREGNANCY, ANTEPARTUM: ICD-10-CM

## 2023-01-02 DIAGNOSIS — F11.20 UNCOMPLICATED OPIOID DEPENDENCE (H): ICD-10-CM

## 2023-01-02 DIAGNOSIS — F90.9 ATTENTION DEFICIT HYPERACTIVITY DISORDER (ADHD), UNSPECIFIED ADHD TYPE: ICD-10-CM

## 2023-01-02 DIAGNOSIS — F11.20 BUPRENORPHINE MAINTENANCE TREATMENT AFFECTING PREGNANCY IN THIRD TRIMESTER (H): ICD-10-CM

## 2023-01-02 DIAGNOSIS — O99.323 BUPRENORPHINE MAINTENANCE TREATMENT AFFECTING PREGNANCY IN THIRD TRIMESTER (H): ICD-10-CM

## 2023-01-02 DIAGNOSIS — Z12.4 CERVICAL CANCER SCREENING: Primary | ICD-10-CM

## 2023-01-02 LAB
ALBUMIN UR-MCNC: NEGATIVE MG/DL
APPEARANCE UR: CLEAR
BILIRUB UR QL STRIP: NEGATIVE
COLOR UR AUTO: YELLOW
ERYTHROCYTE [DISTWIDTH] IN BLOOD BY AUTOMATED COUNT: 13 % (ref 10–15)
GLUCOSE UR STRIP-MCNC: NEGATIVE MG/DL
HCT VFR BLD AUTO: 38 % (ref 35–47)
HGB BLD-MCNC: 12.8 G/DL (ref 11.7–15.7)
HGB UR QL STRIP: NEGATIVE
KETONES UR STRIP-MCNC: NEGATIVE MG/DL
LEUKOCYTE ESTERASE UR QL STRIP: NEGATIVE
MCH RBC QN AUTO: 30.3 PG (ref 26.5–33)
MCHC RBC AUTO-ENTMCNC: 33.7 G/DL (ref 31.5–36.5)
MCV RBC AUTO: 90 FL (ref 78–100)
NITRATE UR QL: NEGATIVE
PH UR STRIP: 5.5 [PH] (ref 5–7)
PLATELET # BLD AUTO: 223 10E3/UL (ref 150–450)
RBC # BLD AUTO: 4.23 10E6/UL (ref 3.8–5.2)
SP GR UR STRIP: >=1.03 (ref 1–1.03)
UROBILINOGEN UR STRIP-ACNC: 0.2 E.U./DL
WBC # BLD AUTO: 8.6 10E3/UL (ref 4–11)

## 2023-01-02 PROCEDURE — 87086 URINE CULTURE/COLONY COUNT: CPT | Performed by: OBSTETRICS & GYNECOLOGY

## 2023-01-02 PROCEDURE — 86762 RUBELLA ANTIBODY: CPT | Performed by: OBSTETRICS & GYNECOLOGY

## 2023-01-02 PROCEDURE — 86850 RBC ANTIBODY SCREEN: CPT | Performed by: OBSTETRICS & GYNECOLOGY

## 2023-01-02 PROCEDURE — 87591 N.GONORRHOEAE DNA AMP PROB: CPT | Performed by: OBSTETRICS & GYNECOLOGY

## 2023-01-02 PROCEDURE — 81003 URINALYSIS AUTO W/O SCOPE: CPT | Performed by: OBSTETRICS & GYNECOLOGY

## 2023-01-02 PROCEDURE — 99207 PR FIRST OB VISIT: CPT | Performed by: OBSTETRICS & GYNECOLOGY

## 2023-01-02 PROCEDURE — 85027 COMPLETE CBC AUTOMATED: CPT | Performed by: OBSTETRICS & GYNECOLOGY

## 2023-01-02 PROCEDURE — 86901 BLOOD TYPING SEROLOGIC RH(D): CPT | Performed by: OBSTETRICS & GYNECOLOGY

## 2023-01-02 PROCEDURE — 86780 TREPONEMA PALLIDUM: CPT | Performed by: OBSTETRICS & GYNECOLOGY

## 2023-01-02 PROCEDURE — 86803 HEPATITIS C AB TEST: CPT | Performed by: OBSTETRICS & GYNECOLOGY

## 2023-01-02 PROCEDURE — 36415 COLL VENOUS BLD VENIPUNCTURE: CPT | Performed by: OBSTETRICS & GYNECOLOGY

## 2023-01-02 PROCEDURE — 87491 CHLMYD TRACH DNA AMP PROBE: CPT | Performed by: OBSTETRICS & GYNECOLOGY

## 2023-01-02 PROCEDURE — 87389 HIV-1 AG W/HIV-1&-2 AB AG IA: CPT | Performed by: OBSTETRICS & GYNECOLOGY

## 2023-01-02 PROCEDURE — 87340 HEPATITIS B SURFACE AG IA: CPT | Performed by: OBSTETRICS & GYNECOLOGY

## 2023-01-02 PROCEDURE — 86900 BLOOD TYPING SEROLOGIC ABO: CPT | Performed by: OBSTETRICS & GYNECOLOGY

## 2023-01-02 NOTE — PATIENT INSTRUCTIONS
If you have any questions regarding your visit, Please contact your care team.     [employer]: [phone]    To Schedule an Appointment 24/7  Call: [phone] TELEPHONE NUM[facility]     German Retana MD  Medical Director    Chacorta Jack-GUNJAN Gleason-Surgery Scheduler  Nathaly-Surgery Scheduler               Tuesday-[place]  7:30 a.m-4:30 p.m    Thursday-[place]  7:30 a.m-4:30 p.m    Typical Surgery Days: Tuesday or Friday [facility] [place]  [address] [place] [address]  PH: [phone]     Imaging Scheduling all locations  PH: [phone]     [facility] Labor and Delivery  [address] [place] [address] [place] [address]  PH: [phone]    [facility]  [address] [place] [address]  PH: [phone] Fax      **Surgeries** Our Surgery Schedulers will contact you to schedule. If you do not receive a call within 3 business days, please call [phone].    Urgent Care locations:  [facility] Monday-Friday  10 am - 8 pm  Saturday and Sunday   9 am - 5 pm  Monday-Friday   10 am - 8 pm  Saturday and Sunday   9 am - 5 pm   [phone]   If you need a medication refill, please contact your pharmacy. Please allow 3 business days for your refill to be completed.  As always, Thank you for trusting us with your healthcare needs!    see additional instructions from your care team below
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
## 2023-01-03 ENCOUNTER — ANCILLARY PROCEDURE (OUTPATIENT)
Dept: ULTRASOUND IMAGING | Facility: CLINIC | Age: 33
End: 2023-01-03
Attending: NURSE PRACTITIONER
Payer: COMMERCIAL

## 2023-01-03 ENCOUNTER — VIRTUAL VISIT (OUTPATIENT)
Dept: FAMILY MEDICINE | Facility: CLINIC | Age: 33
End: 2023-01-03
Payer: COMMERCIAL

## 2023-01-03 DIAGNOSIS — O09.30 LATE PRENATAL CARE AFFECTING PREGNANCY, ANTEPARTUM: ICD-10-CM

## 2023-01-03 DIAGNOSIS — F90.9 ATTENTION DEFICIT HYPERACTIVITY DISORDER (ADHD), UNSPECIFIED ADHD TYPE: Primary | ICD-10-CM

## 2023-01-03 LAB
C TRACH DNA SPEC QL NAA+PROBE: NEGATIVE
HBV SURFACE AG SERPL QL IA: NONREACTIVE
HCV AB SERPL QL IA: NONREACTIVE
HIV 1+2 AB+HIV1 P24 AG SERPL QL IA: NONREACTIVE
N GONORRHOEA DNA SPEC QL NAA+PROBE: NEGATIVE
RUBV IGG SERPL QL IA: 1.8 INDEX
RUBV IGG SERPL QL IA: POSITIVE
T PALLIDUM AB SER QL: NONREACTIVE

## 2023-01-03 PROCEDURE — 99213 OFFICE O/P EST LOW 20 MIN: CPT | Mod: 93 | Performed by: FAMILY MEDICINE

## 2023-01-03 PROCEDURE — 76805 OB US >/= 14 WKS SNGL FETUS: CPT | Mod: TC | Performed by: RADIOLOGY

## 2023-01-03 RX ORDER — DEXTROAMPHETAMINE SACCHARATE, AMPHETAMINE ASPARTATE, DEXTROAMPHETAMINE SULFATE AND AMPHETAMINE SULFATE 5; 5; 5; 5 MG/1; MG/1; MG/1; MG/1
20 TABLET ORAL 2 TIMES DAILY
Qty: 60 TABLET | Refills: 0 | Status: SHIPPED | OUTPATIENT
Start: 2023-01-03 | End: 2023-02-05

## 2023-01-03 RX ORDER — DEXTROAMPHETAMINE SACCHARATE, AMPHETAMINE ASPARTATE, DEXTROAMPHETAMINE SULFATE AND AMPHETAMINE SULFATE 7.5; 7.5; 7.5; 7.5 MG/1; MG/1; MG/1; MG/1
30 TABLET ORAL 2 TIMES DAILY
Qty: 60 TABLET | Refills: 0 | Status: CANCELLED | OUTPATIENT
Start: 2023-01-03

## 2023-01-03 NOTE — PROGRESS NOTES
"Ophelia is a 32 year old who is being evaluated via a billable video visit.      How would you like to obtain your AVS? Mail a copy  If the video visit is dropped, the invitation should be resent by: Text to cell phone: [phone]  Will anyone else be joining your video visit? No        Assessment & Plan     Attention deficit hyperactivity disorder (ADHD), unspecified ADHD type  Continue meds, using only as needed and will switch to lower dose  - amphetamine-dextroamphetamine (ADDERALL) 20 MG tablet; Take 1 tablet (20 mg) by mouth 2 times daily    Late prenatal care affecting pregnancy, antepartum  Per OB               BMI:   Estimated body mass index is 40.97 kg/m  as calculated from the following:    Height as of 1/2/23: 1.702 m (5' 7\").    Weight as of 1/2/23: 118.7 kg (261 lb 9.6 oz).           No follow-ups on file.    Katharine Montalvo MD  [facility]   Ophelia is a 32 year old, presenting for the following health issues:  Recheck Medication (Adderal/)      History of Present Illness       Reason for visit:  Refill adderal    She eats 0-1 servings of fruits and vegetables daily.She consumes 2 sweetened beverage(s) daily.She exercises with enough effort to increase her heart rate 60 or more minutes per day.  She exercises with enough effort to increase her heart rate 3 or less days per week.   She is taking medications regularly.     Pt needs refill of adderall  She is about 30 weeks pregnant  Late to OB care due to insurance issues  Plans to deliver in [place]  She is aware she should minimize dose of adderall to minimize fetus exposure and withdrawal after birth but yet she also needs to be able to function  She needs it for work  She is willing to try a lower dose of 20 mg bid  She plans to take off a week or 2 before her delivery date and will not take the medication and when she is on maternity leave and breastfeeding she plans to not take the medication.   Her " BP is good  She states she is gaining weight appropriately    Review of Systems   Constitutional, HEENT, cardiovascular, pulmonary, gi and gu systems are negative, except as otherwise noted.      Objective           Vitals:  No vitals were obtained today due to virtual visit.    Physical Exam   GENERAL: Healthy, alert and no distress  RESP: No audible wheeze, cough, or visible cyanosis.    PSYCH: Mentation appears normal, affect normal/bright, judgement and insight intact, normal speech and appearance well-groomed.    Results for orders placed or performed in visit on 01/02/23 (from the past 24 hour(s))   UA reflex to Microscopic   Result Value Ref Range    Color Urine Yellow Colorless, Straw, Light Yellow, Yellow    Appearance Urine Clear Clear    Glucose Urine Negative Negative mg/dL    Bilirubin Urine Negative Negative    Ketones Urine Negative Negative mg/dL    Specific Gravity Urine >=1.030 1.003 - 1.035    Blood Urine Negative Negative    pH Urine 5.5 5.0 - 7.0    Protein Albumin Urine Negative Negative mg/dL    Urobilinogen Urine 0.2 0.2, 1.0 E.U./dL    Nitrite Urine Negative Negative    Leukocyte Esterase Urine Negative Negative    Narrative    Microscopic not indicated   Hepatitis C antibody   Result Value Ref Range    Hepatitis C Antibody Nonreactive Nonreactive    Narrative    Assay performance characteristics have not been established for newborns, infants, and children.   HIV Antigen Antibody Combo   Result Value Ref Range    HIV Antigen Antibody Combo Nonreactive Nonreactive   CBC with platelets   Result Value Ref Range    WBC Count 8.6 4.0 - 11.0 10e3/uL    RBC Count 4.23 3.80 - 5.20 10e6/uL    Hemoglobin 12.8 11.7 - 15.7 g/dL    Hematocrit 38.0 35.0 - 47.0 %    MCV 90 78 - 100 fL    MCH 30.3 26.5 - 33.0 pg    MCHC 33.7 31.5 - 36.5 g/dL    RDW 13.0 10.0 - 15.0 %    Platelet Count 223 150 - 450 10e3/uL   Hepatitis B surface antigen   Result Value Ref Range    Hepatitis B Surface Antigen Nonreactive  Nonreactive   ABO/Rh type and screen    Narrative    The following orders were created for panel order ABO/Rh type and screen.  Procedure                               Abnormality         Status                     ---------                               -----------         ------                     Adult Type and Screen[[id number]]                            Edited Result - FINAL        Please view results for these tests on the individual orders.   Adult Type and Screen   Result Value Ref Range    ABO/RH(D) A POS     Antibody Screen Negative Negative    SPECIMEN EXPIRATION DATE [id number]                Video-Visit Details    Type of service:  Video Visit   Video Start Time: 10:09  Video End Time:10:27 AM    Originating Location (pt. Location): Home  Distant Location (provider location):  On-site  Platform used for Video Visit: Doximity  Converted to phone visit as her internet was unable to support video

## 2023-01-03 NOTE — PROGRESS NOTES
"Ophelia is a 32 year old who is being evaluated via a billable telephone visit.      What phone number would you like to be contacted at? ***  How would you like to obtain your AVS? {AVS Preference:418057}  {PROVIDER LOCATION On-site should be selected for visits conducted from your clinic location or adjoining [facility] hospital, academic office, or other nearby [facility] building. Off-site should be selected for all other provider locations, including home:049647}  Distant Location (provider location):  {virtual location provider:194958}    {PROVIDER CHARTING PREFERENCE:233843}    Subjective   Ophelia is a 32 year old{ACCOMPANIED BY STATEMENT (Optional):159142}, presenting for the following health issues:  No chief complaint on file.      HPI     {SUPERLIST (Optional):665307}  {additonal problems for provider to add (Optional):433474}    Review of Systems   {ROS COMP (Optional):001302}      Objective           Vitals:  No vitals were obtained today due to virtual visit.    Physical Exam   {GENERAL APPEARANCE:50::\"healthy\",\"alert\",\"no distress\"}  PSYCH: Alert and oriented times 3; coherent speech, normal   rate and volume, able to articulate logical thoughts, able   to abstract reason, no tangential thoughts, no hallucinations   or delusions  Her affect is { :3819505::\"normal\"}  RESP: No cough, no audible wheezing, able to talk in full sentences  Remainder of exam unable to be completed due to telephone visits    {Diagnostic Test Results (Optional):825023}    {AMBULATORY ATTESTATION (Optional):150427}        Phone call duration: *** minutes    "

## 2023-01-04 LAB — BACTERIA UR CULT: NORMAL

## 2023-01-17 ENCOUNTER — PRENATAL OFFICE VISIT (OUTPATIENT)
Dept: OBGYN | Facility: CLINIC | Age: 33
End: 2023-01-17
Payer: COMMERCIAL

## 2023-01-17 VITALS
SYSTOLIC BLOOD PRESSURE: 138 MMHG | BODY MASS INDEX: 41.91 KG/M2 | HEART RATE: 79 BPM | DIASTOLIC BLOOD PRESSURE: 88 MMHG | WEIGHT: 267.6 LBS

## 2023-01-17 DIAGNOSIS — O99.323 BUPRENORPHINE MAINTENANCE TREATMENT AFFECTING PREGNANCY IN THIRD TRIMESTER (H): ICD-10-CM

## 2023-01-17 DIAGNOSIS — F11.20 BUPRENORPHINE MAINTENANCE TREATMENT AFFECTING PREGNANCY IN THIRD TRIMESTER (H): ICD-10-CM

## 2023-01-17 DIAGNOSIS — F90.9 ATTENTION DEFICIT HYPERACTIVITY DISORDER (ADHD), UNSPECIFIED ADHD TYPE: ICD-10-CM

## 2023-01-17 DIAGNOSIS — E66.813 CLASS 3 OBESITY: ICD-10-CM

## 2023-01-17 DIAGNOSIS — O09.30 LATE PRENATAL CARE AFFECTING PREGNANCY, ANTEPARTUM: Primary | ICD-10-CM

## 2023-01-17 LAB — GLUCOSE 1H P 50 G GLC PO SERPL-MCNC: 82 MG/DL (ref 70–129)

## 2023-01-17 PROCEDURE — 82950 GLUCOSE TEST: CPT | Performed by: OBSTETRICS & GYNECOLOGY

## 2023-01-17 PROCEDURE — 99207 PR COMPLICATED OB VISIT: CPT | Performed by: OBSTETRICS & GYNECOLOGY

## 2023-01-17 PROCEDURE — 36415 COLL VENOUS BLD VENIPUNCTURE: CPT | Performed by: OBSTETRICS & GYNECOLOGY

## 2023-01-17 NOTE — PROGRESS NOTES
Presents for routine  appointment.      No LOF/VB/Ctxs.  +FM  ROS:   and GI  negative.     /88   Pulse 79   Wt 121.4 kg (267 lb 9.6 oz)   LMP 2022   BMI 41.91 kg/m      Results for orders placed or performed in visit on 23   US OB > 14 Weeks    Narrative    ULTRASOUND OBSTETRIC GREATER THAN FOURTEEN WEEKS  1/3/2023 3:13 PM    HISTORY: Screening.    COMPARISON: None.    FINDINGS: The exam was limited due to the gestational age of the fetus  and patient body habitus.    Presentation: Cephalic.  Movement: Unremarkable.  Cardiac activity: 144 bpm. Regular rhythm.  Placenta: Posterior.  No evidence for placenta previa.  Cervical length: 5.1 cm.  Amniotic fluid: Unremarkable. MVP: 3.8 cm.  Maternal Adnexa: Unremarkable.    Measured Parameters:       BPD:  7.6 cm  Age: 30 weeks 3 days.       HC:    28.7 cm  Age: 31 weeks 4 days.       AC:  27 cm  Age: 31 weeks 1 day.       FL:   6.3 cm  Age: 32 weeks 5 days.    Gestational age by current ultrasound measurement: 31 weeks 4 days,  corresponding to an PIPE of 3/3/2023.    Gestational age based on the reported previously established due date:  31 weeks, corresponding to an PIPE of 3/7/2023.    Estimated fetal weight: 1789 grams, corresponding to the 38th  percentile based on the reported previously established due date.     Fetal anatomy survey:        Ventricular atrium: Unremarkable.       Choroid plexus: Unremarkable.       Cisterna magna: Unremarkable.       Cerebellum: Unremarkable.        Midline falx: Unremarkable.       Cavum septum pellucidum: Unremarkable.       Spine: Unremarkable.       Stomach: Unremarkable.       Renal areas: Unremarkable.       Bladder: Unremarkable.       Three-vessel cord: Unremarkable.       Cord insertion into abdomen: Unremarkable.       Cord insertion into placenta: Unremarkable.       Four-chamber heart: Unremarkable.       Right ventricular outflow tract: Unremarkable.       Left ventricular outflow tract:  Unremarkable.       Anterior abdominal wall: Unremarkable.       Diaphragm: Unremarkable.       Profile and face: Unremarkable.       Nose and lips: Unremarkable.       Upper extremities: Unremarkable.       Lower extremities: Unremarkable.      Impression    IMPRESSION:    1. There is a single living intrauterine pregnancy in cephalic  presentation.  2. No fetal structural abnormalities are identified.    JING CROSS MD         SYSTEM ID:  [id number]        A/P:  32 year old  at 33w0d LING    -Hx opioid dependence, currently on subutex 20mgSL (/4daily) and stable. Monitored by [facility]. Nurse navigator at [facility] following.   - Late PNC (>30wks)  -Tobacco use (2-3 cigs every few days), trying to cut back.  -ADHD, on adderall  -Class III obesity. BPPs at 34wks, growth ultrasound at 35-36wks given recent imaging    Routine Prenatal Care:  -Signs and symptoms of  labor discussed as well as fetal kick counts  -Group B Strep at 36+ weeks   -Contraception: declines TL, possibly desires another child. Will think about options  -1hr gtt today    Follow up in 2 weeks.    Kaye Gaming,        2-4 times/mo

## 2023-01-23 ENCOUNTER — ANCILLARY PROCEDURE (OUTPATIENT)
Dept: ULTRASOUND IMAGING | Facility: CLINIC | Age: 33
End: 2023-01-23
Attending: OBSTETRICS & GYNECOLOGY
Payer: COMMERCIAL

## 2023-01-23 DIAGNOSIS — O09.30 LATE PRENATAL CARE AFFECTING PREGNANCY, ANTEPARTUM: ICD-10-CM

## 2023-01-23 DIAGNOSIS — F11.20 BUPRENORPHINE MAINTENANCE TREATMENT AFFECTING PREGNANCY IN THIRD TRIMESTER (H): ICD-10-CM

## 2023-01-23 DIAGNOSIS — O99.323 BUPRENORPHINE MAINTENANCE TREATMENT AFFECTING PREGNANCY IN THIRD TRIMESTER (H): ICD-10-CM

## 2023-01-23 DIAGNOSIS — F90.9 ATTENTION DEFICIT HYPERACTIVITY DISORDER (ADHD), UNSPECIFIED ADHD TYPE: ICD-10-CM

## 2023-01-23 PROCEDURE — 76819 FETAL BIOPHYS PROFIL W/O NST: CPT | Mod: TC | Performed by: RADIOLOGY

## 2023-01-31 ENCOUNTER — PRENATAL OFFICE VISIT (OUTPATIENT)
Dept: OBGYN | Facility: CLINIC | Age: 33
End: 2023-01-31
Payer: COMMERCIAL

## 2023-01-31 ENCOUNTER — ANCILLARY PROCEDURE (OUTPATIENT)
Dept: ULTRASOUND IMAGING | Facility: CLINIC | Age: 33
End: 2023-01-31
Attending: OBSTETRICS & GYNECOLOGY
Payer: COMMERCIAL

## 2023-01-31 ENCOUNTER — TELEPHONE (OUTPATIENT)
Dept: OBGYN | Facility: CLINIC | Age: 33
End: 2023-01-31

## 2023-01-31 VITALS
OXYGEN SATURATION: 99 % | HEART RATE: 69 BPM | BODY MASS INDEX: 42.38 KG/M2 | SYSTOLIC BLOOD PRESSURE: 131 MMHG | HEIGHT: 67 IN | DIASTOLIC BLOOD PRESSURE: 77 MMHG | WEIGHT: 270 LBS

## 2023-01-31 DIAGNOSIS — F11.20 BUPRENORPHINE MAINTENANCE TREATMENT AFFECTING PREGNANCY IN THIRD TRIMESTER (H): ICD-10-CM

## 2023-01-31 DIAGNOSIS — O99.323 BUPRENORPHINE MAINTENANCE TREATMENT AFFECTING PREGNANCY IN THIRD TRIMESTER (H): ICD-10-CM

## 2023-01-31 DIAGNOSIS — O09.30 LATE PRENATAL CARE AFFECTING PREGNANCY, ANTEPARTUM: Primary | ICD-10-CM

## 2023-01-31 PROCEDURE — 76819 FETAL BIOPHYS PROFIL W/O NST: CPT | Mod: TC | Performed by: RADIOLOGY

## 2023-01-31 PROCEDURE — 99207 PR PRENATAL VISIT: CPT | Performed by: NURSE PRACTITIONER

## 2023-01-31 NOTE — TELEPHONE ENCOUNTER
Patient to be having weekly BPP. Has BPP with measurements and prenatal visit scheduled for next week, please help schedule additional with Dr. Gaming if able. Prefers Tuesdays/[place]. Around 11 is a good time but can be flexible. Thank you.  Samantha

## 2023-01-31 NOTE — TELEPHONE ENCOUNTER
Called ultrasound scheduling and schedule BPP's for Tuesdays around 11am and provider to follow appointments.    Called patient and informed her of the dates and times.      Palak Moore, NITA  January 31, 2023

## 2023-01-31 NOTE — PATIENT INSTRUCTIONS
If you have any questions regarding your visit, Please contact your care team.     [employer] [employer] [facility]: [phone]  To Schedule an Appointment 24/7  Call: [phone] HOURS TELEPHONE NUMBER     Samantha Wasserman- APRN CNP      Lay Gleason-Surgery Scheduler  Nathaly-Surgery Scheduler         Monday 7:30 am-5:00 pm    Tuesday 8:00 am-4:00 pm    Wednesday 7:30 am-4:00 pm  [place]    Thursday 8:00 am-11:00 am    Friday 7:30 am-4:00 pm [address] [place] [address] [phone] ask for [facility]  [phone] Fax    Imaging Scheduling all locations  [phone]    [facility] Labor and Delivery  [address] [place] [address]   [place], [place] [id number] [phone]         Urgent Care locations:  [facility]   Monday-Friday  10 am - 8 pm  Saturday and Sunday   9 am - 5 pm     [phone]   If you need a medication refill, please contact your pharmacy. Please allow 3 business days for your refill to be completed.  As always, Thank you for trusting us with your healthcare needs!      see additional instructions from your care team below

## 2023-01-31 NOTE — PROGRESS NOTES
Patient presents for routine prenatal visit. Prenatal flowsheet reviewed and updated as needed.  Denies vaginal bleeding, loss of fluid, contractions or cramping. Denies headache, nausea/vomiting, upper abdominal pain, vision changes, lower extremity swelling, chest pain or shortness of breath.     Anticipatory guidance appropriate for gestational age reviewed.  PE: See OB vitals    Pregnancy complicated by:  -Hx opioid dependence, currently on subutex -monitored by [facility]. Nurse navigator at [facility] following.   -ADHD, on adderall  -Class III obesity. BPPs at 34wks, growth ultrasound at 35-36wks given recent imaging  BPP: Not finalized today-appears 8/8. Again questionable succenturiate lobe seen and this is discussed with the patient. Need close monitoring at delivery and postpartum  Routine Prenatal Care:  -Reviewed signs and symptoms of labor and when to proceed to L&D.  -GBS on return to clinic.  Measurements not ordered for today, added for next week.    Questions asked and answered. Next OB visit in 1 week(s) with OB Physician.    Samantha YUSUF CNP

## 2023-02-01 DIAGNOSIS — F90.9 ATTENTION DEFICIT HYPERACTIVITY DISORDER (ADHD), UNSPECIFIED ADHD TYPE: ICD-10-CM

## 2023-02-01 NOTE — TELEPHONE ENCOUNTER
Reason for Call:  Other prescription    Detailed comments: sia is due for refills and wanted to remind office    Phone Number Patient can be reached at: Home number on file [phone] (home)    Best Time: any    Can we leave a detailed message on this number? YES    Call taken on 2/1/2023 at 10:29 AM by Christi Zepeda

## 2023-02-01 NOTE — TELEPHONE ENCOUNTER
Attempted to contact patient at only telephone number in chart.  Mailbox not set up yet.  Roxy EDWARDS    [facility]

## 2023-02-02 ENCOUNTER — TELEPHONE (OUTPATIENT)
Dept: FAMILY MEDICINE | Facility: CLINIC | Age: 33
End: 2023-02-02
Payer: COMMERCIAL

## 2023-02-03 RX ORDER — DEXTROAMPHETAMINE SACCHARATE, AMPHETAMINE ASPARTATE, DEXTROAMPHETAMINE SULFATE AND AMPHETAMINE SULFATE 5; 5; 5; 5 MG/1; MG/1; MG/1; MG/1
20 TABLET ORAL 2 TIMES DAILY
Qty: 60 TABLET | Refills: 0 | Status: CANCELLED | OUTPATIENT
Start: 2023-02-03

## 2023-02-03 NOTE — TELEPHONE ENCOUNTER
Patient is calling and is asking for the Adderall 20MG 2X a day and not the 30 MG 2x a day.   She thought this was updated  At her recent visit.  She recently went to [facility] on [address] in [place] and it was for the wrong dose, and didn't want the refill to be wrong.  Patient is wanting the Adderall 20 MG sent to [facility] in [place] on [address]. Pharm and med pended.  Yue ESCOBAR  Patient Representative  [phone]

## 2023-02-03 NOTE — TELEPHONE ENCOUNTER
See request below.  Unclear reason for patient thinking that 30 mg tabs were sent in to the [facility] in [place] on [address]. Last Rx written was for 20 mg BID dose and was sent to  in [place] on [facility] on 1/3/23.  Last 30 mg tab Rx that was sent to the  on [address] was from September 2022.  Patient has gotten Adderall refilled at different [facility] locations and [facility] in [place] in the recent past, unclear if a pharmacy still had an old prescription on file, which is possibly what patient is referring to in message below.    Adderall 20 mg, BID prescription T'd up for review. [facility] [place] [facility] pended as preferred pharmacy.      Routing to provider to address.      Catherine Mendoza RN  [facility]

## 2023-02-05 RX ORDER — DEXTROAMPHETAMINE SACCHARATE, AMPHETAMINE ASPARTATE, DEXTROAMPHETAMINE SULFATE AND AMPHETAMINE SULFATE 5; 5; 5; 5 MG/1; MG/1; MG/1; MG/1
20 TABLET ORAL 2 TIMES DAILY
Qty: 60 TABLET | Refills: 0 | Status: SHIPPED | OUTPATIENT
Start: 2023-02-05 | End: 2023-03-01

## 2023-02-05 NOTE — TELEPHONE ENCOUNTER
For some reason the pharmacy used a prescription for adderall 30 mg bid that was written on 9/7/22   And filled it on 2/1/23.   She must have requested the refill from the pharmacy and they used this one  Not sure why.     New dose sent    Katharine Montalvo MD

## 2023-02-07 ENCOUNTER — ANCILLARY PROCEDURE (OUTPATIENT)
Dept: ULTRASOUND IMAGING | Facility: CLINIC | Age: 33
End: 2023-02-07
Attending: NURSE PRACTITIONER
Payer: COMMERCIAL

## 2023-02-07 ENCOUNTER — PRENATAL OFFICE VISIT (OUTPATIENT)
Dept: OBGYN | Facility: CLINIC | Age: 33
End: 2023-02-07
Payer: COMMERCIAL

## 2023-02-07 VITALS
WEIGHT: 274.8 LBS | SYSTOLIC BLOOD PRESSURE: 114 MMHG | HEART RATE: 78 BPM | BODY MASS INDEX: 43.04 KG/M2 | OXYGEN SATURATION: 98 % | DIASTOLIC BLOOD PRESSURE: 77 MMHG

## 2023-02-07 DIAGNOSIS — O99.323 BUPRENORPHINE MAINTENANCE TREATMENT AFFECTING PREGNANCY IN THIRD TRIMESTER (H): ICD-10-CM

## 2023-02-07 DIAGNOSIS — O09.30 LATE PRENATAL CARE AFFECTING PREGNANCY, ANTEPARTUM: ICD-10-CM

## 2023-02-07 DIAGNOSIS — O09.30 LATE PRENATAL CARE AFFECTING PREGNANCY, ANTEPARTUM: Primary | ICD-10-CM

## 2023-02-07 DIAGNOSIS — F11.20 BUPRENORPHINE MAINTENANCE TREATMENT AFFECTING PREGNANCY IN THIRD TRIMESTER (H): ICD-10-CM

## 2023-02-07 PROCEDURE — 76819 FETAL BIOPHYS PROFIL W/O NST: CPT | Mod: TC | Performed by: RADIOLOGY

## 2023-02-07 PROCEDURE — 76816 OB US FOLLOW-UP PER FETUS: CPT | Mod: TC | Performed by: RADIOLOGY

## 2023-02-07 PROCEDURE — 99207 PR PRENATAL VISIT: CPT | Performed by: NURSE PRACTITIONER

## 2023-02-07 PROCEDURE — 87653 STREP B DNA AMP PROBE: CPT | Performed by: NURSE PRACTITIONER

## 2023-02-07 NOTE — PATIENT INSTRUCTIONS
If you have any questions regarding your visit, Please contact your care team.     [facility] [employer] [facility]: [phone]  To Schedule an Appointment 24/7  Call: [phone] HOURS TELEPHONE NUMBER     Samantha Wasserman- APRN CNP      [facility]-Surgery Scheduler  Nathaly-Surgery Scheduler         Monday 7:30 am-5:00 pm    Tuesday 8:00 am-4:00 pm    Wednesday 7:30 am-4:00 pm  [place]    Thursday 8:00 am-11:00 am    Friday 7:30 am-4:00 pm [address] [place] [address] [phone] ask for [facility]  [phone] Fax    Imaging Scheduling all locations  [phone]    [facility] Labor and Delivery  [address] [place] [address]   [place], [place] [id number] [phone]         Urgent Care locations:  [facility]   Monday-Friday  10 am - 8 pm  Saturday and Sunday   9 am - 5 pm     [phone]   If you need a medication refill, please contact your pharmacy. Please allow 3 business days for your refill to be completed.  As always, Thank you for trusting us with your healthcare needs!      see additional instructions from your care team below

## 2023-02-07 NOTE — PROGRESS NOTES
Patient presents for routine prenatal visit. Prenatal flowsheet reviewed and updated as needed.  Denies vaginal bleeding, loss of fluid, contractions or cramping. Denies headache, nausea/vomiting, upper abdominal pain, vision changes, lower extremity swelling, chest pain or shortness of breath.     Anticipatory guidance appropriate for gestational age reviewed.  PE: See OB vitals    Pregnancy complicated by:  -Hx opioid dependence, currently on subutex -monitored by [facility]. Nurse navigator at [facility] following.   -ADHD, on adderall  -Class III obesity. BPPs at 34wks, growth ultrasound done today: BPP: not finalized today-appears 8/8. EFW 52nd percentile  Again questionable succenturiate lobe seen and this is discussed with the patient. Need close monitoring at delivery and postpartum    Routine Prenatal Care:  -Reviewed signs and symptoms of labor and when to proceed to L&D.  -GBS today.     Questions asked and answered. Next OB visit in 1 week(s) with OB Physician.    Samantha YUSUF CNP

## 2023-02-08 ENCOUNTER — TELEPHONE (OUTPATIENT)
Dept: FAMILY MEDICINE | Facility: CLINIC | Age: 33
End: 2023-02-08
Payer: COMMERCIAL

## 2023-02-08 LAB — GP B STREP DNA SPEC QL NAA+PROBE: NEGATIVE

## 2023-02-08 NOTE — TELEPHONE ENCOUNTER
Please call pharmacy and let them know it is okay to refill adderall at 20 mg  For some reason they used a prescription dated 9/7/22 for the most recent refill, thus the error in the dose    Katharine Montalvo MD

## 2023-02-08 NOTE — TELEPHONE ENCOUNTER
Medication Question or Refill    Contacts       Type Contact Phone/Fax    02/08/2023 12:32 PM CST Phone (Incoming) Ophelia Stratton (Self) [phone] (M)          What medication are you calling about (include dose and sig)?: Adderall 20 mg    Preferred Pharmacy:  [facility] #[id number] [address] & [address] [address]  Phone: [phone] Fax: [phone]        Controlled Substance Agreement on file:   CSA -- Patient Level:     [Media Unavailable] Controlled Substance Agreement - Non - Opioid - Scan on 11/5/2019  5:52 PM: NON-OPIOID CONTROLLED SUBSTANCE AGREEMENT       Who prescribed the medication?: Dr Portillo    Do you need a refill? Yes, No    When did you use the medication last?     Patient offered an appointment? No    Do you have any questions or concerns?  Yes: [facility] needs an OK to fill the adderall 20 mg, since 30 mg was also prescribed.      Okay to leave a detailed message?: Yes at Cell number on file:    Telephone Information:   Mobile [phone]     Patricia Palacios MA/BIJAN

## 2023-02-14 ENCOUNTER — PRENATAL OFFICE VISIT (OUTPATIENT)
Dept: OBGYN | Facility: CLINIC | Age: 33
End: 2023-02-14
Payer: COMMERCIAL

## 2023-02-14 ENCOUNTER — ANCILLARY PROCEDURE (OUTPATIENT)
Dept: ULTRASOUND IMAGING | Facility: CLINIC | Age: 33
End: 2023-02-14
Attending: OBSTETRICS & GYNECOLOGY
Payer: COMMERCIAL

## 2023-02-14 VITALS
DIASTOLIC BLOOD PRESSURE: 74 MMHG | HEART RATE: 75 BPM | WEIGHT: 276.4 LBS | OXYGEN SATURATION: 98 % | SYSTOLIC BLOOD PRESSURE: 109 MMHG | BODY MASS INDEX: 43.29 KG/M2

## 2023-02-14 DIAGNOSIS — F90.9 ATTENTION DEFICIT HYPERACTIVITY DISORDER (ADHD), UNSPECIFIED ADHD TYPE: ICD-10-CM

## 2023-02-14 DIAGNOSIS — O99.323 BUPRENORPHINE MAINTENANCE TREATMENT AFFECTING PREGNANCY IN THIRD TRIMESTER (H): ICD-10-CM

## 2023-02-14 DIAGNOSIS — E66.813 CLASS 3 OBESITY: ICD-10-CM

## 2023-02-14 DIAGNOSIS — F11.20 BUPRENORPHINE MAINTENANCE TREATMENT AFFECTING PREGNANCY IN THIRD TRIMESTER (H): ICD-10-CM

## 2023-02-14 DIAGNOSIS — O09.30 LATE PRENATAL CARE AFFECTING PREGNANCY, ANTEPARTUM: Primary | ICD-10-CM

## 2023-02-14 PROCEDURE — 99207 PR PRENATAL VISIT: CPT | Performed by: OBSTETRICS & GYNECOLOGY

## 2023-02-14 PROCEDURE — 76819 FETAL BIOPHYS PROFIL W/O NST: CPT | Mod: TC | Performed by: RADIOLOGY

## 2023-02-14 NOTE — PROGRESS NOTES
Presents for routine  appointment.      No LOF/VB/Ctxs.  +FM  ROS:   and GI  negative.     /74 (BP Location: Left arm, Patient Position: Sitting, Cuff Size: Adult Large)   Pulse 75   Wt 125.4 kg (276 lb 6.4 oz)   LMP 2022   SpO2 98%   BMI 43.29 kg/m        A/P:  32 year old  at 37w0d LING    Pregnancy c/b:  -Hx opioid dependence, currently on subutex 20mgSL (/4daily) and stable. Monitored by [facility]. Nurse navigator at [facility] following. BPP  today per tech report  - Late PNC (>30wks)  -Tobacco use (2-3 cigs every few days), trying to cut back.  -ADHD, on adderall  -Class III obesity. BPPs at 34wks, EFW at 36wks 52%tile  -? Succenturiate lobe, previously reviewed    Routine Prenatal Care:  -GBS neg  -Contraception: declines TL, possibly desires another child. Will think about options    Follow up in 1 weeks.    Kaye Gaming, DO

## 2023-02-14 NOTE — PATIENT INSTRUCTIONS
If you have any questions regarding your visit, Please contact your care team.    [facility]: [phone]      [facility] [place] HOURS TELEPHONE NUMBER   Kaye Gaming DO.    MATTHEW Yadav-Surgery Scheduler  Nathaly - Surgery Scheduler    GUNJAN Jack, GUNJAN Wilson RN     Monday, Thursday  [place]  7am-3pm    Tuesday, Wednesday  [place]  7am-3pm    E/O Friday &   [place]    Typical Surgery Days: Thursday or Friday   [facility]  [address] [place] [address] [phone] Phone  [phone] Fax    [address] [address] [phone] Phone    Imaging [phone] Phone    [facility]:  [phone] Phone     **Surgeries** Our Surgery Schedulers will contact you to schedule. If you do not receive a call within 3 business days, please call [phone].    Urgent Care locations:  [facility] Saturday and    9 am - 5 pm    Monday-Friday   12 pm - 8 pm  Saturday and    9 am - 5 pm   [phone]       If you need a medication refill, please contact your pharmacy. Please allow 3 business days for your refill to be completed.  As always, Thank you for trusting us with your healthcare needs!

## 2023-02-28 ENCOUNTER — PRENATAL OFFICE VISIT (OUTPATIENT)
Dept: OBGYN | Facility: CLINIC | Age: 33
End: 2023-02-28
Payer: COMMERCIAL

## 2023-02-28 ENCOUNTER — ANCILLARY PROCEDURE (OUTPATIENT)
Dept: ULTRASOUND IMAGING | Facility: CLINIC | Age: 33
End: 2023-02-28
Attending: OBSTETRICS & GYNECOLOGY
Payer: COMMERCIAL

## 2023-02-28 VITALS
WEIGHT: 277.4 LBS | OXYGEN SATURATION: 94 % | SYSTOLIC BLOOD PRESSURE: 126 MMHG | HEIGHT: 67 IN | HEART RATE: 76 BPM | BODY MASS INDEX: 43.54 KG/M2 | DIASTOLIC BLOOD PRESSURE: 83 MMHG

## 2023-02-28 DIAGNOSIS — E66.813 CLASS 3 OBESITY: ICD-10-CM

## 2023-02-28 DIAGNOSIS — F11.20 BUPRENORPHINE MAINTENANCE TREATMENT AFFECTING PREGNANCY IN THIRD TRIMESTER (H): ICD-10-CM

## 2023-02-28 DIAGNOSIS — O09.30 LATE PRENATAL CARE AFFECTING PREGNANCY, ANTEPARTUM: Primary | ICD-10-CM

## 2023-02-28 DIAGNOSIS — O99.323 BUPRENORPHINE MAINTENANCE TREATMENT AFFECTING PREGNANCY IN THIRD TRIMESTER (H): ICD-10-CM

## 2023-02-28 PROCEDURE — 99207 PR PRENATAL VISIT: CPT | Performed by: NURSE PRACTITIONER

## 2023-02-28 PROCEDURE — 76819 FETAL BIOPHYS PROFIL W/O NST: CPT | Mod: TC | Performed by: RADIOLOGY

## 2023-02-28 NOTE — PROGRESS NOTES
Patient presents for routine prenatal visit. Prenatal flowsheet reviewed and updated as needed.  Denies vaginal bleeding, loss of fluid, contractions or cramping. Denies headache, nausea/vomiting, upper abdominal pain, vision changes, lower extremity swelling, chest pain or shortness of breath.     Anticipatory guidance appropriate for gestational age reviewed.  PE: See OB vitals    Pregnancy complicated by:  -Hx opioid dependence, currently on subutex. Monitored by [facility]. Nurse navigator at [facility] following. BPP 8/8 today per tech report  - Late PNC (>30wks)  -Class III obesity. BPPs at 34wks, EFW at 36wks 52%tile  -? Succenturiate lobe, previously reviewed     Reviewed option of induction due to Class 3 obesity, Subutex use and discussed rationale. At this time, patient would like to give it one more week and schedule induction at next visit if not yet delivered. Will schedule BPP for next week in addition to PNV.  Reviewed signs and symptoms of labor and when to proceed to L&D.    Questions asked and answered. Next OB visit in 1 week(s) with OB Physician.    Samantha YUSUF CNP

## 2023-03-01 ENCOUNTER — TELEPHONE (OUTPATIENT)
Dept: FAMILY MEDICINE | Facility: CLINIC | Age: 33
End: 2023-03-01
Payer: COMMERCIAL

## 2023-03-01 DIAGNOSIS — F90.9 ATTENTION DEFICIT HYPERACTIVITY DISORDER (ADHD), UNSPECIFIED ADHD TYPE: ICD-10-CM

## 2023-03-01 RX ORDER — DEXTROAMPHETAMINE SACCHARATE, AMPHETAMINE ASPARTATE, DEXTROAMPHETAMINE SULFATE AND AMPHETAMINE SULFATE 5; 5; 5; 5 MG/1; MG/1; MG/1; MG/1
20 TABLET ORAL 2 TIMES DAILY
Qty: 60 TABLET | Refills: 0 | Status: SHIPPED | OUTPATIENT
Start: 2023-03-01 | End: 2023-03-07

## 2023-03-01 NOTE — TELEPHONE ENCOUNTER
Medication Question or Refill    Contacts       Type Contact Phone/Fax    03/01/2023 09:22 AM CST Phone (Incoming) Ophelia Stratton (Self) [phone] (M)          What medication are you calling about (include dose and sig)?: MADDY    Pref[facility] Pharmacy:   [id number] - [place], [place] - [facility] & [address] [address]  Phone: [phone] Fax: [phone]  Controlled Substance Agreement on file:   CSA -- Patient Level:     [Media Unavailable] Controlled Substance Agreement - Non - Opioid - Scan on 11/5/2019  5:52 PM: NON-OPIOID CONTROLLED SUBSTANCE AGREEMENT       Who prescribed the medication?: SHAYY GERMAN MD    Do you need a refill? Yes    When did you use the medication last? 2/28/23    Patient offered an appointment? No    Do you have any questions or concerns?  No      Okay to leave a detailed message?: Yes at Home number on file [phone] (home)

## 2023-03-07 ENCOUNTER — TELEPHONE (OUTPATIENT)
Dept: FAMILY MEDICINE | Facility: CLINIC | Age: 33
End: 2023-03-07

## 2023-03-07 DIAGNOSIS — F90.9 ATTENTION DEFICIT HYPERACTIVITY DISORDER (ADHD), UNSPECIFIED ADHD TYPE: ICD-10-CM

## 2023-03-07 RX ORDER — DEXTROAMPHETAMINE SACCHARATE, AMPHETAMINE ASPARTATE, DEXTROAMPHETAMINE SULFATE AND AMPHETAMINE SULFATE 5; 5; 5; 5 MG/1; MG/1; MG/1; MG/1
20 TABLET ORAL 2 TIMES DAILY
Qty: 60 TABLET | Refills: 0 | Status: SHIPPED | OUTPATIENT
Start: 2023-03-07 | End: 2023-04-03

## 2023-03-07 NOTE — TELEPHONE ENCOUNTER
Patient is calling again, Would another provider be willing to send this RX to a different pharmacy?Patricia Palacios MA/TC

## 2023-03-07 NOTE — TELEPHONE ENCOUNTER
Reason for Call:  Other prescription    Detailed comments: Patient states [facility] in [place] is out of Adderall and would like Rx [facility] another [facility] on [facility] phone is [phone].    Phone Number Patient can be reached at: Cell number on file:    Telephone Information:   Mobile [phone]       Best Time: any    Can we leave a detailed message on this number? YES    Call taken on 3/7/2023 at 9:24 AM by Yasmin Clifford

## 2023-03-07 NOTE — TELEPHONE ENCOUNTER
Informed patient new Rx sent to [facility] on [facility].  Roxy EDWARDS    [facility]

## 2023-03-08 NOTE — PROGRESS NOTES
Patient presents for routine prenatal visit. Prenatal flowsheet reviewed and updated as needed.  Denies vaginal bleeding, loss of fluid, contractions or cramping. Denies headache, nausea/vomiting, upper abdominal pain, vision changes, lower extremity swelling, chest pain or shortness of breath.     Anticipatory guidance appropriate for gestational age reviewed.  PE: See OB vitals    Pregnancy complicated by:  -Hx opioid dependence, currently on subutex. Monitored by [facility]. Nurse navigator at [facility] following. BPP yesterday 8/8.  - Late PNC (>30wks)  -Class III obesity. BPPs at 34wks, EFW at 36wks 52%tile  -Possible succenturiate lobe, previously reviewed      Had declined scheduling induction last week, missed appointment earlier this week. Today, again discussed induction and rationale and at this time she is amenable to scheduling.   Earliest available at this time is ripening on 3/16/23 and this is scheduled. As it is nearly a week out, patient will set up one additional ultrasound early next week and prenatal visit with physician is scheduled.    Routine prenatal care:  Reviewed signs and symptoms of labor and when to proceed to L&D.    Questions asked and answered.     Samantha YUSUF CNP

## 2023-03-09 ENCOUNTER — ANCILLARY PROCEDURE (OUTPATIENT)
Dept: ULTRASOUND IMAGING | Facility: CLINIC | Age: 33
End: 2023-03-09
Attending: NURSE PRACTITIONER
Payer: COMMERCIAL

## 2023-03-09 DIAGNOSIS — E66.813 CLASS 3 OBESITY: ICD-10-CM

## 2023-03-09 DIAGNOSIS — O99.323 BUPRENORPHINE MAINTENANCE TREATMENT AFFECTING PREGNANCY IN THIRD TRIMESTER (H): ICD-10-CM

## 2023-03-09 DIAGNOSIS — F11.20 BUPRENORPHINE MAINTENANCE TREATMENT AFFECTING PREGNANCY IN THIRD TRIMESTER (H): ICD-10-CM

## 2023-03-09 PROCEDURE — 76819 FETAL BIOPHYS PROFIL W/O NST: CPT | Mod: TC | Performed by: RADIOLOGY

## 2023-03-10 ENCOUNTER — PRENATAL OFFICE VISIT (OUTPATIENT)
Dept: OBGYN | Facility: CLINIC | Age: 33
End: 2023-03-10
Payer: COMMERCIAL

## 2023-03-10 VITALS
DIASTOLIC BLOOD PRESSURE: 84 MMHG | SYSTOLIC BLOOD PRESSURE: 129 MMHG | BODY MASS INDEX: 43.73 KG/M2 | OXYGEN SATURATION: 96 % | WEIGHT: 278.6 LBS | HEART RATE: 83 BPM | HEIGHT: 67 IN

## 2023-03-10 DIAGNOSIS — O48.0 POST-TERM PREGNANCY, 40-42 WEEKS OF GESTATION: Primary | ICD-10-CM

## 2023-03-10 PROCEDURE — 99207 PR PRENATAL VISIT: CPT | Performed by: NURSE PRACTITIONER

## 2023-03-10 NOTE — PATIENT INSTRUCTIONS
If you have any questions regarding your visit, Please contact your care team.     [facility]: [phone]  To Schedule an Appointment 24/7  Call: [phone] HOU[facility] TELEPHONE NUMBER     Samantha Wasserman- APRN CNP      Lay Gleason-Surgery Scheduler  Nathaly-Surgery Scheduler         Monday 7:30 am-5:00 pm    Tuesday 8:00 am-4:00 pm    Wednesday 7:30 am-4:00 pm  [place]    Thursday 8:00 am-11:00 am    Friday 7:30 am-4:00 pm [address] [place] [address] [phone] ask for [facility]  [phone] Fax    Imaging Scheduling all locations  [phone]    [facility] Delivery  [address] [place] [address]   [place], [place] [id number] [phone]         Urgent Care locations:  [facility]   Monday-Friday  10 am - 8 pm  Saturday and Sunday   9 am - 5 pm     [phone]   If you need a medication refill, please contact your pharmacy. Please allow 3 business days for your refill to be completed.  As always, Thank you for trusting us with your healthcare needs!      see additional instructions from your care team below

## 2023-03-13 ENCOUNTER — PRENATAL OFFICE VISIT (OUTPATIENT)
Dept: OBGYN | Facility: CLINIC | Age: 33
End: 2023-03-13
Payer: COMMERCIAL

## 2023-03-13 VITALS
HEART RATE: 83 BPM | OXYGEN SATURATION: 96 % | BODY MASS INDEX: 44.29 KG/M2 | DIASTOLIC BLOOD PRESSURE: 108 MMHG | WEIGHT: 282.8 LBS | SYSTOLIC BLOOD PRESSURE: 167 MMHG

## 2023-03-13 DIAGNOSIS — O99.323 BUPRENORPHINE MAINTENANCE TREATMENT AFFECTING PREGNANCY IN THIRD TRIMESTER (H): Primary | ICD-10-CM

## 2023-03-13 DIAGNOSIS — F11.20 BUPRENORPHINE MAINTENANCE TREATMENT AFFECTING PREGNANCY IN THIRD TRIMESTER (H): Primary | ICD-10-CM

## 2023-03-13 DIAGNOSIS — O09.30 LATE PRENATAL CARE AFFECTING PREGNANCY, ANTEPARTUM: ICD-10-CM

## 2023-03-13 DIAGNOSIS — O09.90 HIGH RISK PREGNANCY, ANTEPARTUM: ICD-10-CM

## 2023-03-13 PROCEDURE — 99207 PR PRENATAL VISIT: CPT | Performed by: OBSTETRICS & GYNECOLOGY

## 2023-03-13 NOTE — PATIENT INSTRUCTIONS
If you have any questions regarding your visit, Please contact your care team.     [facility]: [phone]    To Schedule an Appointment 24/7  Call: [phone] HOU[facility] TELEPHONE NUM[facility]     German Retana MD  Medical Director    Chacorta Jack-GUNJAN Gleason-Surgery Scheduler  Nathaly-Surgery Scheduler               Tuesday-[place]  7:30 a.m-4:30 p.m    Thursday-[place]  7:30 a.m-4:30 p.m    Typical Surgery Days: Tuesday or Friday [facility] [place]  [address] [place] [address]  PH: [phone]     Imaging Scheduling all locations  PH: [phone]     [facility] Labor [facility] Delivery  [address] [place] [address] [place] [address]  PH: [phone]    [facility]  [address] [place] [address]  PH: [phone] Fax      **Surgeries** Our Surgery Schedulers will contact you to schedule. If you do not receive a call within 3 business days, please call [phone].    Urgent Care locations:  [facility] Monday-Friday  10 am - 8 pm  Saturday and Sunday   9 am - 5 pm  Monday-Friday   10 am - 8 pm  Saturday and Sunday   9 am - 5 pm   [phone]   If you need a medication refill, please contact your pharmacy. Please allow 3 business days for your refill to be completed.  As always, Thank you for trusting us with your healthcare needs!    see additional instructions from your care team below

## 2023-03-13 NOTE — PROGRESS NOTES
Patient presents for routine prenatal visit at 40w6d.  Patient without complaint. Denies headache or scotomata  PE: See OB vitals  Body mass index is 44.29 kg/m .  No vaginal bleeding, LOF, contractions.  No HA, RUQ pain, N/V, visual changes.  Questions asked and answered.  To LABOR AND DELIVER for evaluation and possible admission for cervical ripening    German Retana MD FACOG

## 2023-03-19 ENCOUNTER — NURSE TRIAGE (OUTPATIENT)
Dept: NURSING | Facility: CLINIC | Age: 33
End: 2023-03-19
Payer: COMMERCIAL

## 2023-03-19 NOTE — TELEPHONE ENCOUNTER
Triage Call    Pt says she is 5 days Post-op C/S; Delivered at [facility] with [facility] Provider.    Calling now because she was sent home yesterday, and this morning her B/P is high, says she has been intermittently lightheaded and has a continuous mild HA. Denies visual changes.    B/P she initially called with was 166/102.  Had pt retake her B/P which was 180/110.  Says she has not had hypertension in Pregnancy, Labor, or anytime pre or Post-op.     Pt has some incisional pain but rates that 3/10, and says she has been alternating ibuprofen and tylenol.    Triaged to Go to ED Now ; Care Advice given per Adult High Blood Pressure Guideline.      Iwona Magallanes RN      Reason for Disposition    [1] Systolic BP  >= 160 OR Diastolic >= 100 AND [2] cardiac or neurologic symptoms (e.g., chest pain, difficulty breathing, unsteady gait, blurred vision)    Additional Information    Negative: Chest pain    Negative: Difficulty breathing    Negative: Abdominal pain (not incision pain) is main symptom    Negative: Surgical incision symptoms and questions    Negative: Pain or burning with passing urine (urination)    Negative: Constipation    Negative: Calf pain    Negative: Leg swelling    Negative: New blurred vision or vision changes    Negative: Headache    Negative: Medication question    Negative: [1] Widespread rash AND [2] bright red, sunburn-like    Negative: Fever > 100.4 F (38.0 C)    Negative: [1] SEVERE pain AND [2] not relieved by pain medications    Negative: [1] SEVERE headache AND [2] not relieved with pain medications (e.g., acetaminophen / Tylenol)    Negative: [1] Vomiting AND [2] persists > 4 hours    Negative: [1] Vomiting AND [2] abdomen is getting more swollen    Negative: [1] Drinking very little AND [2] dehydration suspected (e.g., no urine > 12 hours, very dry mouth, very lightheaded)    Negative: Patient sounds very sick or weak to the triager    Negative: Foul smelling vaginal  discharge (i.e., lochia)    Negative: [1] Something is hanging out of the vagina AND [2] can't easily be pushed back inside    Negative: [1] Caller has URGENT question AND [2] triager unable to answer question    Negative: Difficult to awaken or acting confused (e.g., disoriented, slurred speech)    Negative: SEVERE difficulty breathing (e.g., struggling for each breath, speaks in single words)    Negative: [1] Weakness of the face, arm or leg on one side of the body AND [2] new-onset    Negative: [1] Numbness (i.e., loss of sensation) of the face, arm or leg on one side of the body AND [2] new-onset    Negative: [1] Chest pain lasts > 5 minutes AND [2] history of heart disease (i.e., heart attack, bypass surgery, angina, angioplasty, CHF)    Negative: [1] Chest pain AND [2] took nitrogylcerin AND [3] pain was not relieved    Negative: Sounds like a life-threatening emergency to the triager    Negative: Symptom is main concern (e.g., headache, chest pain)    Negative: Low blood pressure is main concern    Protocols used: BLOOD PRESSURE - HIGH-A-AH, POSTPARTUM -  SYMPTOMS-A-AH

## 2023-03-20 ENCOUNTER — TELEPHONE (OUTPATIENT)
Dept: OBGYN | Facility: CLINIC | Age: 33
End: 2023-03-20
Payer: COMMERCIAL

## 2023-03-20 NOTE — TELEPHONE ENCOUNTER
----- Message from Kaye Gaming, DO sent at 3/19/2023  8:47 AM CDT -----  Please follow-up with patient on Monday regarding Bps at home over the weekend after discharge from the ER and ensure clinic BP follow-up Monday or Tuesday.    Thank you,  Kaye Gaming, DO     Unable to reach patient via phone. RN left a message and instructed patient to call the clinic at [phone].      RN routing to provider to confirm if BP check needs to be with provider or if OK to have with RN (Tuesday).    Katie Simpson RN on 3/20/2023 at 8:49 AM

## 2023-03-20 NOTE — TELEPHONE ENCOUNTER
See other telephone encounter    Hannah Romero MA       RN called and spoke with pt. Pt states BP's have been in the 120/130s, no headaches or vision changes that are new.     Pt states she will call back (is in the middle of another phone appt right now) to schedule a nurse only BP check. Pt can come Wednesday as long as symptoms are not worsening (GUNJAN Ragland will be in clinic).     Katie Simpson RN on 3/20/2023 at 9:24 AM

## 2023-03-20 NOTE — TELEPHONE ENCOUNTER
RN contacted pt. Pt coming to  on 3/22 at 2:00PM for a BP check with RN (GUNJAN Ragland).    Pt aware to contact us if BP's increase above 140/90, headaches, vision changes or additional symptoms develop before then.    Patient verbalized understanding and agreed to plan.     Katie Simpson RN on 3/20/2023 at 3:04 PM

## 2023-03-20 NOTE — TELEPHONE ENCOUNTER
RN called and spoke with pt. Pt states BP's have been in the 120/130s, no headaches or vision changes that are new.    Pt states she will call back (is in the middle of another phone appt right now) to schedule a nurse only BP check. Pt can come Wednesday as long as symptoms are not worsening (GUNJAN Ragland will be in clinic).    Katie Simpson RN on 3/20/2023 at 9:24 AM

## 2023-03-20 NOTE — TELEPHONE ENCOUNTER
Reason for Call:  Appointment Request    Patient requesting this type of appt:  1 week postpartum     Requested provider: Dr Gaming    Reason patient unable to be scheduled: Not within requested timeframe    When does patient want to be seen/preferred time: 1 week    Comments: Please call to schedule     Okay to leave a detailed message?: Yes at Cell number on file:    Telephone Information:   Mobile [phone]       Call taken on 3/20/2023 at 7:16 AM by Ban Talavera

## 2023-03-22 ENCOUNTER — TELEPHONE (OUTPATIENT)
Dept: OBGYN | Facility: CLINIC | Age: 33
End: 2023-03-22

## 2023-03-22 RX ORDER — OXYCODONE HYDROCHLORIDE 10 MG/1
TABLET ORAL
COMMUNITY
Start: 2023-03-17 | End: 2023-08-22

## 2023-03-22 RX ORDER — IBUPROFEN 600 MG/1
TABLET, FILM COATED ORAL
COMMUNITY
Start: 2023-03-17 | End: 2023-09-14

## 2023-03-22 NOTE — TELEPHONE ENCOUNTER
Pt no-showed 3/22 apt. Called pt, she forgot today's appointment and is unable to come in later today States her BP's have been good, no concerning symptoms. Rescheduled pt for 3/24 BP check.    Ellyn Dorsey RN on 3/22/2023 at 2:57 PM

## 2023-03-22 NOTE — TELEPHONE ENCOUNTER
Pt had a  on 3/14.  She is calling in requesting more pain medications.  I reminded pt that if she is still requiring prescribed pain medications at this time then she should be seen in office for further assessment. Otherwise, our surgeons advise transitioning to just taking Tylenol and Ibuprofen once narcotic pain medications are out to help manage pain.    When asked where her pain was located she states she has chronic back pain so her pain is primarily back pain but also has some in her abdomen.  She states she thinks she has been over doing it.      Denies fever, body aches, chills.    Incision looks good.      Took last oxycodone this morning and has been taking Ibuprofen and Tylenol as well.  Pt was very vague with symptoms and states she most likely won't need prescribed pain meds for too much longer.    Dr. Sesay did pt's  but pt was asking if I could send the message to Dr. Gaming since Dr. Gaming is the one that saw her for prenatal care and discharged her from the hospital and prescribed meds for her.   Dr. Gaming nor Dr. Sesay has clinic openings this week.    Routing to Dr. Gaming for further advice.  Should pt be seen for further assessment if she is requesting pain meds and if so, is Dr. Gaming able to work her into her schedule or would Dr. Gaming be okay prescribing another short rx of pain meds.    Addendum: pt missed her BP check nurse visit today but it is now rescheduled for Friday, 3/24, at [place] at 1 pm.    Marcie Jacobo RN

## 2023-03-23 NOTE — TELEPHONE ENCOUNTER
Kaye Gaming, DO  [facility] Triage 34 minutes ago (7:50 AM)     KK  Agree with RN, recommend against continued use of narcotic pain medications this far out from . Patient should limit her activity to surgery restrictions to assist with further pain control issues. Furthermore, given that patient has a pain specialist, I recommend following up with them for pain control options.      Attempted to reach pt to discuss Dr. Gaming's message above.  No answer and VM box has not been set up so unable to LVM.  Will attempt to reach at a later time.    Marcie Jacobo RN

## 2023-03-24 ENCOUNTER — ALLIED HEALTH/NURSE VISIT (OUTPATIENT)
Dept: NURSING | Facility: CLINIC | Age: 33
End: 2023-03-24
Payer: COMMERCIAL

## 2023-03-24 VITALS
HEART RATE: 64 BPM | DIASTOLIC BLOOD PRESSURE: 77 MMHG | RESPIRATION RATE: 16 BRPM | OXYGEN SATURATION: 97 % | SYSTOLIC BLOOD PRESSURE: 122 MMHG

## 2023-03-24 DIAGNOSIS — O09.90 HIGH RISK PREGNANCY, ANTEPARTUM: Primary | ICD-10-CM

## 2023-03-24 PROCEDURE — 99207 PR NO CHARGE NURSE ONLY: CPT

## 2023-03-24 RX ORDER — DOCUSATE SODIUM 100 MG/1
CAPSULE, LIQUID FILLED ORAL
COMMUNITY
Start: 2023-03-19 | End: 2024-04-03

## 2023-03-24 RX ORDER — LABETALOL 200 MG/1
200 TABLET, FILM COATED ORAL 3 TIMES DAILY
COMMUNITY
Start: 2023-03-19 | End: 2023-09-14

## 2023-03-24 NOTE — PROCEDURES
Ophelia Stratton is a 33 year old patient who comes in today for a Blood Pressure check.  Initial BP:  /77 (BP Location: Left arm)   Pulse 64   Resp 16   LMP 05/31/2022   SpO2 97%      64  Disposition: follow-up as previously indicated by provider    Pt denies any new headaches, vision changes or abdominal pain. Pt taking labetalol 200mg TID. Pt aware to reach out for any new symptoms.    Pt has PP on 4/25/2023.    Patient verbalized understanding and agreed to plan.     Katie Simpson RN on 3/24/2023 at 1:15 PM

## 2023-03-29 ENCOUNTER — MEDICAL CORRESPONDENCE (OUTPATIENT)
Dept: HEALTH INFORMATION MANAGEMENT | Facility: CLINIC | Age: 33
End: 2023-03-29
Payer: COMMERCIAL

## 2023-03-31 ENCOUNTER — TELEPHONE (OUTPATIENT)
Dept: FAMILY MEDICINE | Facility: CLINIC | Age: 33
End: 2023-03-31
Payer: COMMERCIAL

## 2023-03-31 DIAGNOSIS — F90.9 ATTENTION DEFICIT HYPERACTIVITY DISORDER (ADHD), UNSPECIFIED ADHD TYPE: ICD-10-CM

## 2023-03-31 NOTE — TELEPHONE ENCOUNTER
Medication Question or Refill    Contacts       Type Contact Phone/Fax    03/31/2023 02:41 PM CDT Phone (Incoming) Ophelia Stratton (Self) [phone] (M)     Adderral refill - wanted to switch back to the 30mg instead of 20 because child was born and am not pregnant          What medication are you calling about (include dose and sig)?: Adderral    Preferred Pharmacy:   [facility][id number] [place], [place] - [address] [place] [address]  Phone: [phone] Fax: [phone]    Controlled Substance Agreement on file:   CSA -- Patient Level:     [Media Unavailable] Controlled Substance Agreement - Non - Opioid - Scan on 11/5/2019  5:52 PM: NON-OPIOID CONTROLLED SUBSTANCE AGREEMENT       Who prescribed the medication?: Katharine Montalvo MD    Do you need a refill? Yes    When did you use the medication last? 3/31/23    Patient offered an appointment? No    Do you have any questions or concerns?  No      Okay to leave a detailed message?: Yes at Cell number on file:    Telephone Information:   Mobile [phone]

## 2023-04-03 ENCOUNTER — NURSE TRIAGE (OUTPATIENT)
Dept: NURSING | Facility: CLINIC | Age: 33
End: 2023-04-03
Payer: COMMERCIAL

## 2023-04-03 DIAGNOSIS — F90.9 ATTENTION DEFICIT HYPERACTIVITY DISORDER (ADHD), UNSPECIFIED ADHD TYPE: ICD-10-CM

## 2023-04-03 RX ORDER — DEXTROAMPHETAMINE SACCHARATE, AMPHETAMINE ASPARTATE, DEXTROAMPHETAMINE SULFATE AND AMPHETAMINE SULFATE 5; 5; 5; 5 MG/1; MG/1; MG/1; MG/1
20 TABLET ORAL 2 TIMES DAILY
Qty: 60 TABLET | Refills: 0 | Status: SHIPPED | OUTPATIENT
Start: 2023-04-03 | End: 2023-05-01

## 2023-04-03 NOTE — TELEPHONE ENCOUNTER
Patient called.  She states she is wanting a refill on her Adderall.  She states she had her baby 2 weeks ago and is wanting to go back to the 30MG dose of Adderall.    I explained the provider just sent in the 20MG dose.  Patient states she will not pick that up, so ok to cancel and send the updated dose.    Will send request to PCP.    Eliza Garcia RN   04/03/23 4:47 PM  [facility] Nurse Advisor    Reason for Disposition    Caller requesting a CONTROLLED substance prescription refill (e.g., narcotics, ADHD medicines)    Additional Information    Negative: New-onset or worsening symptoms, see that guideline (e.g., diarrhea, runny nose, sore throat)    Negative: Medicine question not related to refill or renewal    Negative: Caller (e.g., patient or pharmacist) requesting information about a new medicine    Negative: Caller requesting information unrelated to medicine    Negative: [1] Prescription refill request for ESSENTIAL medicine (i.e., likelihood of harm to patient if not taken) AND [2] triager unable to refill per department policy    Negative: [1] Prescription not at pharmacy AND [2] was prescribed by PCP recently  (Exception: triager has access to EMR and prescription is recorded there. Go to Home Care and confirm for pharmacy.)    Negative: [1] Pharmacy calling with prescription questions AND [2] triager unable to answer question    Negative: Prescription request for new medicine (not a refill)    Protocols used: MEDICATION REFILL AND RENEWAL CALL-A-

## 2023-04-04 RX ORDER — DEXTROAMPHETAMINE SACCHARATE, AMPHETAMINE ASPARTATE, DEXTROAMPHETAMINE SULFATE AND AMPHETAMINE SULFATE 7.5; 7.5; 7.5; 7.5 MG/1; MG/1; MG/1; MG/1
30 TABLET ORAL 2 TIMES DAILY
Qty: 60 TABLET | Refills: 0 | Status: SHIPPED | OUTPATIENT
Start: 2023-04-04 | End: 2023-05-02

## 2023-04-27 RX ORDER — LABETALOL 200 MG/1
TABLET, FILM COATED ORAL
Qty: 90 TABLET | OUTPATIENT
Start: 2023-04-27

## 2023-05-01 ENCOUNTER — TELEPHONE (OUTPATIENT)
Dept: FAMILY MEDICINE | Facility: CLINIC | Age: 33
End: 2023-05-01

## 2023-05-01 DIAGNOSIS — F90.9 ATTENTION DEFICIT HYPERACTIVITY DISORDER (ADHD), UNSPECIFIED ADHD TYPE: ICD-10-CM

## 2023-05-01 RX ORDER — DEXTROAMPHETAMINE SACCHARATE, AMPHETAMINE ASPARTATE, DEXTROAMPHETAMINE SULFATE AND AMPHETAMINE SULFATE 5; 5; 5; 5 MG/1; MG/1; MG/1; MG/1
20 TABLET ORAL 2 TIMES DAILY
Qty: 60 TABLET | Refills: 0 | Status: SHIPPED | OUTPATIENT
Start: 2023-05-01 | End: 2023-05-01

## 2023-05-01 NOTE — TELEPHONE ENCOUNTER
Medication Question or Refill    Contacts       Type Contact Phone/Fax    05/01/2023 02:51 PM CDT Phone (Incoming) Ophelia Stratton (Self) [phone] (M)          What medication are you calling about (include dose and sig)?: Adderall 30 mg -1 tab BID    Preferred Pharmacy:   [facility] #[id number] [address] & [address] [address]  Phone: [phone] Fax: [phone]    Controlled Substance Agreement on file:   CSA -- Patient Level:     [Media Unavailable] Controlled Substance Agreement - Non - Opioid - Scan on 11/5/2019  5:52 PM: NON-OPIOID CONTROLLED SUBSTANCE AGREEMENT       Who prescribed the medication?: Josh    Do you need a refill? Yes    When did you use the medication last? Today     Patient offered an appointment? Yes: pt decline    Do you have any questions or concerns?  Yes: pt need the 30 mg tab instead of the 20 mg.      Okay to leave a detailed message?: Yes at Cell number on file:    Telephone Information:   Mobile [phone]

## 2023-05-01 NOTE — TELEPHONE ENCOUNTER
Reason for call:  Other   Patient called regarding (reason for call): call back and prescription  Additional comments: Per patient: needs a refill on amphetamine-dextroamphetamine (ADDERALL) 20 MG tablet    Phone number to reach patient:  Cell number on file:    Telephone Information:   Mobile [phone]       Best Time:  Anytime    Can we leave a detailed message on this number?  YES    Travel screening: Not Applicable

## 2023-05-02 RX ORDER — DEXTROAMPHETAMINE SACCHARATE, AMPHETAMINE ASPARTATE, DEXTROAMPHETAMINE SULFATE AND AMPHETAMINE SULFATE 7.5; 7.5; 7.5; 7.5 MG/1; MG/1; MG/1; MG/1
30 TABLET ORAL 2 TIMES DAILY
Qty: 60 TABLET | Refills: 0 | Status: SHIPPED | OUTPATIENT
Start: 2023-05-02 | End: 2023-06-20

## 2023-05-02 NOTE — TELEPHONE ENCOUNTER
Pharmacy is calling and stating that patient picked up 60  20MG of Adderall on  5/1/23. Today she was prescribed another 60 30MG Adderall. They are wondering when they can dispense those to patient?  Please advise,  Yue ESCOBAR  Patient Representative  [phone]

## 2023-05-02 NOTE — TELEPHONE ENCOUNTER
In 30 days  rx for 20 mg was sent over on 5/1/23 because that was the request  She will need to use the 20 mg tablets first.   I have now updated her med list so only the 30 mg tablets are on there  She needs to make sure when she picks them up that they are the correct dose    Katharine Montalvo MD

## 2023-05-02 NOTE — TELEPHONE ENCOUNTER
Spoke with patient.  She is not breastfeeding her baby.  Please send Rx for Adderall 30 mg to [facility] in [place].  Thank you.  Roxy EDWARDS    [facility]

## 2023-05-02 NOTE — TELEPHONE ENCOUNTER
Ophelia is wondering if she would be able to take 1 pill, and then cut a pill in half to equal 30 MG until next fill date. But she would run out of medication sooner than the next fill date.   Please advise,  Yue ESCOBAR  Patient Representative  [phone]

## 2023-05-02 NOTE — TELEPHONE ENCOUNTER
Please call pt  She recently delivered a child  Is she currently  Breastfeeding?    Katharine Montalvo MD

## 2023-05-02 NOTE — TELEPHONE ENCOUNTER
RN contacted [facility] staff, Carlos Casper, and relayed provider's 5/2/2023  4:40 PM note below, and provider's 5:03 PM note below in response to pt's inquiry about using the 20 mg tablets to achieve her 30 mg dose.    Carlos Casper, states that the 20 mg tablets that were dispensed to pt may be split, so pt can take 30 mg per dose.  Carlos Casper, states the 60 tablets of amphetamine-dextroamphetamine (ADDERALL) 20 MG tablets, will only last for a 20-day-supply, if pt uses 30 mg per dose.    Carlos Casper, requests that provider please confirm if the amphetamine-dextroamphetamine (ADDERALL) 30 MG tablet order can be dispensed to pt in 20 days, when she runs out of the 20 mg tablets (if pt uses the 20 mg tablets to take 30 mg per dose). Please return call to Pharmacist with provider response.    RN contacted pt and notified her of the provider's 4:40pm followed by 5:03pm notes below, and informed her that Carlos Casper, did confirm that pt may split the 20 mg tablets in half to achieve her 30 mg dose, and would therefore run out of 20 mg tables in 20 days. Pt indicates understanding of issues and agrees with the plan.    CECILIA Ochoa, RN

## 2023-05-03 NOTE — TELEPHONE ENCOUNTER
Roxy, pharmacist at [id number] - [place], [place] - [address] AT [facility], was given the Ok to fill the Adderall 30 mg in 20 days when due. The remaining 20 mg tablets Prescription(s) were cancelled so they would not inadvertently be filled.  Thank you. Stephanie Ruff R.N.

## 2023-06-19 DIAGNOSIS — F90.9 ATTENTION DEFICIT HYPERACTIVITY DISORDER (ADHD), UNSPECIFIED ADHD TYPE: ICD-10-CM

## 2023-06-19 NOTE — TELEPHONE ENCOUNTER
Reason for Call:  Medication or medication refill:    Do you use a [facility]?  Name of the pharmacy and phone number for the current request:  [facility]    Name of the medication requested: Adderall    Other request: Patient has a few days left    Can we leave a detailed message on this number? YES    Phone number patient can be reached at: Cell number on file:    Telephone Information:   Mobile [phone]       Best Time: Any    Call taken on 6/19/2023 at 3:47 PM by Mary Miller

## 2023-06-20 RX ORDER — DEXTROAMPHETAMINE SACCHARATE, AMPHETAMINE ASPARTATE, DEXTROAMPHETAMINE SULFATE AND AMPHETAMINE SULFATE 7.5; 7.5; 7.5; 7.5 MG/1; MG/1; MG/1; MG/1
30 TABLET ORAL 2 TIMES DAILY
Qty: 60 TABLET | Refills: 0 | Status: SHIPPED | OUTPATIENT
Start: 2023-06-20 | End: 2023-07-18

## 2023-07-17 DIAGNOSIS — F90.9 ATTENTION DEFICIT HYPERACTIVITY DISORDER (ADHD), UNSPECIFIED ADHD TYPE: ICD-10-CM

## 2023-07-17 NOTE — TELEPHONE ENCOUNTER
Medication Question or Refill        What medication are you calling about (include dose and sig)?: Adderrall 30 mg 60 count    Preferred Pharmacy:   [facility][id number] [place], [place] - [address] [facility]  [address] [place] [address]  Phone: [phone] Fax: [phone]          Controlled Substance Agreement on file:   CSA -- Patient Level:     [Media Unavailable] Controlled Substance Agreement - Non - Opioid - Scan on 11/5/2019  5:52 PM: NON-OPIOID CONTROLLED SUBSTANCE AGREEMENT       Who prescribed the medication?: Dr. Portillo    Do you need a refill? Yes    When did you use the medication last? 07/17/2023    Patient offered an appointment? No    Do you have any questions or concerns?  No      Okay to leave a detailed message?: Yes at Cell number on file:    Telephone Information:   Mobile [phone]

## 2023-07-18 RX ORDER — DEXTROAMPHETAMINE SACCHARATE, AMPHETAMINE ASPARTATE, DEXTROAMPHETAMINE SULFATE AND AMPHETAMINE SULFATE 7.5; 7.5; 7.5; 7.5 MG/1; MG/1; MG/1; MG/1
30 TABLET ORAL 2 TIMES DAILY
Qty: 60 TABLET | Refills: 0 | Status: SHIPPED | OUTPATIENT
Start: 2023-07-18 | End: 2023-08-22

## 2023-07-18 NOTE — TELEPHONE ENCOUNTER
Needs to be seen for further refills  Please call pt to schedule an appt as she is overdue  Once appt is scheduled, send refill request back to me    Katharine Montalvo MD

## 2023-07-18 NOTE — TELEPHONE ENCOUNTER
Dr. Montalvo,     Patient scheduled for medication recheck on 8/23/23 at 4:30 pm with you.  Please send refills through to pharmacy pended. Thank you!  Roxy EDWARDS    [facility]

## 2023-08-23 ENCOUNTER — OFFICE VISIT (OUTPATIENT)
Dept: FAMILY MEDICINE | Facility: CLINIC | Age: 33
End: 2023-08-23
Payer: COMMERCIAL

## 2023-08-23 VITALS
TEMPERATURE: 99.2 F | RESPIRATION RATE: 16 BRPM | HEART RATE: 75 BPM | HEIGHT: 67 IN | OXYGEN SATURATION: 96 % | SYSTOLIC BLOOD PRESSURE: 135 MMHG | WEIGHT: 277 LBS | DIASTOLIC BLOOD PRESSURE: 93 MMHG | BODY MASS INDEX: 43.47 KG/M2

## 2023-08-23 DIAGNOSIS — Z12.4 CERVICAL CANCER SCREENING: ICD-10-CM

## 2023-08-23 DIAGNOSIS — F90.9 ATTENTION DEFICIT HYPERACTIVITY DISORDER (ADHD), UNSPECIFIED ADHD TYPE: Primary | ICD-10-CM

## 2023-08-23 PROCEDURE — 99213 OFFICE O/P EST LOW 20 MIN: CPT | Performed by: FAMILY MEDICINE

## 2023-08-23 RX ORDER — DEXTROAMPHETAMINE SACCHARATE, AMPHETAMINE ASPARTATE, DEXTROAMPHETAMINE SULFATE AND AMPHETAMINE SULFATE 7.5; 7.5; 7.5; 7.5 MG/1; MG/1; MG/1; MG/1
30 TABLET ORAL 2 TIMES DAILY
Qty: 60 TABLET | Refills: 0 | Status: SHIPPED | OUTPATIENT
Start: 2023-08-23 | End: 2023-09-14

## 2023-08-23 ASSESSMENT — PAIN SCALES - GENERAL: PAINLEVEL: NO PAIN (0)

## 2023-08-23 NOTE — PROGRESS NOTES
"  Assessment & Plan     Attention deficit hyperactivity disorder (ADHD), unspecified ADHD type  Follow-up in 6 months  - amphetamine-dextroamphetamine (ADDERALL) 30 MG tablet; Take 1 tablet (30 mg) by mouth 2 times daily One tablet first thing in the morning, one 4-5 hours later.    Cervical cancer screening  Due, declined today. Plan to do in 6 months             Nicotine/Tobacco Cessation:  She reports that she has been smoking cigarettes. She has been smoking an average of .25 packs per day. She has never used smokeless tobacco.  Nicotine/Tobacco Cessation Plan:   Information offered: Patient not interested at this time      BMI:   Estimated body mass index is 43.38 kg/m  as calculated from the following:    Height as of this encounter: 1.702 m (5' 7\").    Weight as of this encounter: 125.6 kg (277 lb).   Weight management plan: Discussed healthy diet and exercise guidelines        Katharine Montalvo MD  [facility]    Camille Jacinto is a 33 year old, presenting for the following health issues:  Recheck Medication        8/23/2023     4:16 PM   Additional Questions   Roomed by neha       History of Present Illness       Reason for visit:  Med check    She eats 2-3 servings of fruits and vegetables daily.She consumes 2 sweetened beverage(s) daily.She exercises with enough effort to increase her heart rate 60 or more minutes per day.  She exercises with enough effort to increase her heart rate 4 days per week.   She is taking medications regularly.     Doing well  No concerns  On suboxone as well  PDMP checked  BP elevated today but came down  She will monitor  It was elevated at end of pregnancy in March  No insomnia, tics or weight loss          Review of Systems   Constitutional, HEENT, cardiovascular, pulmonary, gi and gu systems are negative, except as otherwise noted.      Objective    BP (!) 160/93   Pulse 87   Temp 99.2  F (37.3  C) (Oral)   Resp 16   Ht 1.702 m (5' 7\")   " Wt 125.6 kg (277 lb)   LMP 08/15/2023   SpO2 96%   Breastfeeding No   BMI 43.38 kg/m    Body mass index is 43.38 kg/m .  Physical Exam   GENERAL: healthy, alert and no distress  RESP: lungs clear to auscultation - no rales, rhonchi or wheezes  CV: regular rate and rhythm, normal S1 S2, no S3 or S4, no murmur, click or rub, no peripheral edema and peripheral pulses strong    No results found for this or any previous visit (from the past 24 hour(s)).

## 2023-09-12 DIAGNOSIS — F90.9 ATTENTION DEFICIT HYPERACTIVITY DISORDER (ADHD), UNSPECIFIED ADHD TYPE: ICD-10-CM

## 2023-09-12 RX ORDER — DEXTROAMPHETAMINE SACCHARATE, AMPHETAMINE ASPARTATE, DEXTROAMPHETAMINE SULFATE AND AMPHETAMINE SULFATE 7.5; 7.5; 7.5; 7.5 MG/1; MG/1; MG/1; MG/1
30 TABLET ORAL 2 TIMES DAILY
Qty: 60 TABLET | Refills: 0 | OUTPATIENT
Start: 2023-09-12

## 2023-09-12 NOTE — TELEPHONE ENCOUNTER
Medication Question or Refill        What medication are you calling about (include dose and sig)?: Adderall     Preferred Pharmacy:   [id number] [place], [place] - [address] [facility]  [address] [place] [address]  Phone: [phone] Fax: [phone]      Controlled Substance Agreement on file:   CSA -- Patient Level:     [Media Unavailable] Controlled Substance Agreement - Non - Opioid - Scan on 11/5/2019  5:52 PM: NON-OPIOID CONTROLLED SUBSTANCE AGREEMENT       Who prescribed the medication?: Katharine Cali    Do you need a refill? Yes    When did you use the medication last? today    Patient offered an appointment? No    Do you have any questions or concerns?  Yes: Patient is leaving for camping for a week on 09/15/27276       Okay to leave a detailed message?: Yes at Cell number on file:    Telephone Information:   Mobile [phone]

## 2023-09-14 RX ORDER — DEXTROAMPHETAMINE SACCHARATE, AMPHETAMINE ASPARTATE, DEXTROAMPHETAMINE SULFATE AND AMPHETAMINE SULFATE 7.5; 7.5; 7.5; 7.5 MG/1; MG/1; MG/1; MG/1
30 TABLET ORAL 2 TIMES DAILY
Qty: 14 TABLET | Refills: 0 | Status: SHIPPED | OUTPATIENT
Start: 2023-09-14 | End: 2023-09-18

## 2023-09-14 NOTE — TELEPHONE ENCOUNTER
Medication Question or Refill    Contacts         Type Contact Phone/Fax    09/12/2023 04:14 PM CDT Phone (Incoming) Ophelia Stratton (Self) [phone] (M)    09/14/2023 03:20 PM CDT Phone (Incoming) Ophelia Stratton (Self) [phone] (M)            What medication are you calling about (include dose and sig)?:    Disp Refills Start End ERICKA   amphetamine-dextroamphetamine (ADDERALL) 30 MG tablet            Preferred Pharmacy:   [id number] - [place] - [address] AT [facility]  [address] [place] [address]  Phone: [phone] Fax: [phone]    [facility][id number] - Closed  [place] - [address]  [address] [facility] [address]  Phone: [phone] Fax: [phone]    [facility] [employer] - [place], [place] - [address] [facility]  [address] [address]  Phone: [phone] Fax: [phone]    [id number] - [place] - [address] AT [facility] [address] [facility]  [address] [place] [address]  Phone: [phone] Fax: [phone]    [id number] - [place] - [address]  [address] [place] [address]  Phone: [phone] Fax: [phone]      Controlled Substance Agreement on file:   CSA -- Patient Level:     [Media Unavailable] Controlled Substance Agreement - Non - Opioid - Scan on 11/5/2019  5:52 PM: NON-OPIOID CONTROLLED SUBSTANCE AGREEMENT       Who prescribed the medication?: Katharine Montalvo MD     Do you need a refill? yes    When did you use the medication last? unknown    Patient offered an appointment? No    Do you have any questions or concerns?  Yes: needs by tomorrow or next day hopefully 9/15/23/9/16/23      Okay to leave a detailed message?: Yes at Home number on file [phone] (home)

## 2023-09-14 NOTE — TELEPHONE ENCOUNTER
Please notify pt  is out of clinic this week.   I have sent a 1 week supply to cover her until she returns from her trip.

## 2023-09-14 NOTE — TELEPHONE ENCOUNTER
Called patient and she states she is requesting Rx early because she is leaving for a camping trip tomorrow 9/15 & returning 9/24. Does not want to run out. Asked that I send a message back to provider explaining this. Please advise.     Pau Jesus,    [facility]

## 2023-09-18 ENCOUNTER — TELEPHONE (OUTPATIENT)
Dept: FAMILY MEDICINE | Facility: CLINIC | Age: 33
End: 2023-09-18
Payer: COMMERCIAL

## 2023-09-18 DIAGNOSIS — F90.9 ATTENTION DEFICIT HYPERACTIVITY DISORDER (ADHD), UNSPECIFIED ADHD TYPE: ICD-10-CM

## 2023-09-18 RX ORDER — DEXTROAMPHETAMINE SACCHARATE, AMPHETAMINE ASPARTATE, DEXTROAMPHETAMINE SULFATE AND AMPHETAMINE SULFATE 7.5; 7.5; 7.5; 7.5 MG/1; MG/1; MG/1; MG/1
30 TABLET ORAL 2 TIMES DAILY
Qty: 60 TABLET | Refills: 0 | Status: SHIPPED | OUTPATIENT
Start: 2023-09-21 | End: 2023-10-19

## 2023-09-18 NOTE — TELEPHONE ENCOUNTER
Patient did not  partial medication, because she was not able to go out of town. She would like that prescription cancelled and a full prescription called in for her to  tomorrow.

## 2023-09-19 NOTE — TELEPHONE ENCOUNTER
Patient called back asking if her medication has been sent to the pharmacy ( [facility] in [place] on file).  I said no but pending.     Patient again wanted the provider to know that she originally wanted a partial prescription due to going out of town.  She is no longer going out of town and would like the normal refill if possible.    Will route    Eliza Garcia RN   09/18/23 10:21 PM  [facility] Nurse Advisor

## 2023-10-18 DIAGNOSIS — F90.9 ATTENTION DEFICIT HYPERACTIVITY DISORDER (ADHD), UNSPECIFIED ADHD TYPE: ICD-10-CM

## 2023-10-19 RX ORDER — DEXTROAMPHETAMINE SACCHARATE, AMPHETAMINE ASPARTATE, DEXTROAMPHETAMINE SULFATE AND AMPHETAMINE SULFATE 7.5; 7.5; 7.5; 7.5 MG/1; MG/1; MG/1; MG/1
30 TABLET ORAL 2 TIMES DAILY
Qty: 60 TABLET | Refills: 0 | Status: SHIPPED | OUTPATIENT
Start: 2023-10-19 | End: 2023-11-16

## 2023-10-19 NOTE — TELEPHONE ENCOUNTER
Reason for call:  Medication   If this is a refill request, has the caller requested the refill from the pharmacy already? No  Will the patient be using a [facility]? No  Name of the pharmacy and phone number for the current request: [facility]    Name of the medication requested: Adderall 30 mg twice a day    Other request: n/a    Phone number to reach patient:  Cell number on file:    Telephone Information:   Mobile [phone]       Best Time:  anytime    Can we leave a detailed message on this number?  YES    Travel screening: Not Applicable

## 2023-11-16 DIAGNOSIS — F90.9 ATTENTION DEFICIT HYPERACTIVITY DISORDER (ADHD), UNSPECIFIED ADHD TYPE: ICD-10-CM

## 2023-11-16 RX ORDER — DEXTROAMPHETAMINE SACCHARATE, AMPHETAMINE ASPARTATE, DEXTROAMPHETAMINE SULFATE AND AMPHETAMINE SULFATE 7.5; 7.5; 7.5; 7.5 MG/1; MG/1; MG/1; MG/1
30 TABLET ORAL 2 TIMES DAILY
Qty: 60 TABLET | Refills: 0 | Status: SHIPPED | OUTPATIENT
Start: 2023-11-16 | End: 2023-12-15

## 2023-11-16 NOTE — TELEPHONE ENCOUNTER
Patient requesting refill on Rx amphetamine-dextroamphetamine (ADDERALL) 30 MG tablet. She is aware she is not due for a couple days.    Med and pharmacy pended in chart.    Pau Jesus,    [facility]

## 2023-12-15 DIAGNOSIS — F90.9 ATTENTION DEFICIT HYPERACTIVITY DISORDER (ADHD), UNSPECIFIED ADHD TYPE: ICD-10-CM

## 2023-12-15 RX ORDER — DEXTROAMPHETAMINE SACCHARATE, AMPHETAMINE ASPARTATE, DEXTROAMPHETAMINE SULFATE AND AMPHETAMINE SULFATE 7.5; 7.5; 7.5; 7.5 MG/1; MG/1; MG/1; MG/1
30 TABLET ORAL 2 TIMES DAILY
Qty: 60 TABLET | Refills: 0 | Status: SHIPPED | OUTPATIENT
Start: 2023-12-15 | End: 2024-01-15

## 2023-12-15 NOTE — TELEPHONE ENCOUNTER
Patient requesting refill on Rx amphetamine-dextroamphetamine (ADDERALL) 30 MG tablet. Med and pharmacy pended.     Pau Jesus,    [facility]

## 2024-01-15 DIAGNOSIS — F90.9 ATTENTION DEFICIT HYPERACTIVITY DISORDER (ADHD), UNSPECIFIED ADHD TYPE: ICD-10-CM

## 2024-01-15 RX ORDER — DEXTROAMPHETAMINE SACCHARATE, AMPHETAMINE ASPARTATE, DEXTROAMPHETAMINE SULFATE AND AMPHETAMINE SULFATE 7.5; 7.5; 7.5; 7.5 MG/1; MG/1; MG/1; MG/1
30 TABLET ORAL 2 TIMES DAILY
Qty: 60 TABLET | Refills: 0 | Status: SHIPPED | OUTPATIENT
Start: 2024-01-15 | End: 2024-02-12

## 2024-01-15 NOTE — TELEPHONE ENCOUNTER
Medication Question or Refill    Contacts         Type Contact Phone/Fax    01/15/2024 01:00 AM CST Phone (Incoming) Ophelia Stratton (Self) [phone] (M)            What medication are you calling about (include dose and sig)?: amphetamine-dextroamphetamine (ADDERALL) 30 MG tablet  Sig - Route: Take 1 tablet (30 mg) by mouth 2 times daily One tablet first thing in the morning, one 4-5 hours later. - Oral    Preferred Pharmacy:  [facility][id number] [place], [place] - [address] [place] [address] & [address] [address]  Phone: [phone] Fax: [phone]        Controlled Substance Agreement on file:   CSA -- Patient Level:     [Media Unavailable] Controlled Substance Agreement - Non - Opioid - Scan on 11/5/2019  5:52 PM: NON-OPIOID CONTROLLED SUBSTANCE AGREEMENT       Who prescribed the medication?: Dr Portillo    Do you need a refill? Yes    When did you use the medication last? Yesterday AM    Patient offered an appointment? Yes: Patient declined to make an appointment     Do you have any questions or concerns?  No      Okay to leave a detailed message?: Yes at Home number on file [phone] (home)

## 2024-02-12 DIAGNOSIS — F90.9 ATTENTION DEFICIT HYPERACTIVITY DISORDER (ADHD), UNSPECIFIED ADHD TYPE: ICD-10-CM

## 2024-02-12 RX ORDER — DEXTROAMPHETAMINE SACCHARATE, AMPHETAMINE ASPARTATE, DEXTROAMPHETAMINE SULFATE AND AMPHETAMINE SULFATE 7.5; 7.5; 7.5; 7.5 MG/1; MG/1; MG/1; MG/1
30 TABLET ORAL 2 TIMES DAILY
Qty: 60 TABLET | Refills: 0 | Status: SHIPPED | OUTPATIENT
Start: 2024-02-12 | End: 2024-03-14

## 2024-02-12 NOTE — TELEPHONE ENCOUNTER
Medication Question or Refill        What medication are you calling about (include dose and sig)?: amphetamine-dextroamphetamine (ADDERALL) 30 MG tablet     Preferred Pharmacy:   [facility] #[id number] [place], [place] - [address] & [address] [address]  Phone: [phone] Fax: [phone]              Controlled Substance Agreement on file:   CSA -- Patient Level:     [Media Unavailable] Controlled Substance Agreement - Non - Opioid - Scan on 11/5/2019  5:52 PM: NON-OPIOID CONTROLLED SUBSTANCE AGREEMENT       Who prescribed the medication?: primary    Do you need a refill? Yes    When did you use the medication last? 1/15    Patient offered an appointment? No    Do you have any questions or concerns?  No      Okay to leave a detailed message?: Yes at Home number on file [phone] (home)

## 2024-03-13 DIAGNOSIS — F90.9 ATTENTION DEFICIT HYPERACTIVITY DISORDER (ADHD), UNSPECIFIED ADHD TYPE: ICD-10-CM

## 2024-03-14 DIAGNOSIS — F11.20 UNCOMPLICATED OPIOID DEPENDENCE (H): Primary | ICD-10-CM

## 2024-03-14 RX ORDER — DEXTROAMPHETAMINE SACCHARATE, AMPHETAMINE ASPARTATE, DEXTROAMPHETAMINE SULFATE AND AMPHETAMINE SULFATE 7.5; 7.5; 7.5; 7.5 MG/1; MG/1; MG/1; MG/1
30 TABLET ORAL 2 TIMES DAILY
Qty: 60 TABLET | Refills: 0 | Status: SHIPPED | OUTPATIENT
Start: 2024-03-14

## 2024-03-14 NOTE — TELEPHONE ENCOUNTER
Attempt #1 to reach patient. No VM set up and no answer.     Pau HERNDON,    [facility]

## 2024-03-14 NOTE — TELEPHONE ENCOUNTER
She is overdue to be seen  No further refills until she is seen  She needs an appointment scheduled before I will refill  She knows she needs appt every 6 months  She was due last month    Katharine Montalvo MD

## 2024-03-14 NOTE — TELEPHONE ENCOUNTER
General Call    Contacts         Type Contact Phone/Fax    03/13/2024 09:39 PM CDT Phone (Incoming) Ophelia Stratton (Self) [phone] (M)          Reason for Call:  Pt needs a refill for Adderall. Can you call her to confirm it's been sent to the pharmacy? Thank you!      Okay to leave a detailed message?: Yes at Home number on file [phone] (home)

## 2024-04-03 ENCOUNTER — LAB (OUTPATIENT)
Dept: LAB | Facility: CLINIC | Age: 34
End: 2024-04-03
Payer: COMMERCIAL

## 2024-04-03 ENCOUNTER — OFFICE VISIT (OUTPATIENT)
Dept: FAMILY MEDICINE | Facility: CLINIC | Age: 34
End: 2024-04-03
Payer: COMMERCIAL

## 2024-04-03 VITALS
WEIGHT: 265 LBS | TEMPERATURE: 98.7 F | BODY MASS INDEX: 42.59 KG/M2 | RESPIRATION RATE: 15 BRPM | HEIGHT: 66 IN | OXYGEN SATURATION: 99 % | DIASTOLIC BLOOD PRESSURE: 79 MMHG | HEART RATE: 87 BPM | SYSTOLIC BLOOD PRESSURE: 149 MMHG

## 2024-04-03 DIAGNOSIS — T40.1X1D ACCIDENTAL OVERDOSE OF HEROIN, SUBSEQUENT ENCOUNTER: ICD-10-CM

## 2024-04-03 DIAGNOSIS — F11.20 UNCOMPLICATED OPIOID DEPENDENCE (H): ICD-10-CM

## 2024-04-03 DIAGNOSIS — F90.9 ATTENTION DEFICIT HYPERACTIVITY DISORDER (ADHD), UNSPECIFIED ADHD TYPE: Primary | ICD-10-CM

## 2024-04-03 LAB
AMPHETAMINES UR QL SCN: ABNORMAL
BARBITURATES UR QL SCN: ABNORMAL
BENZODIAZ UR QL SCN: ABNORMAL
BZE UR QL SCN: ABNORMAL
CANNABINOIDS UR QL SCN: ABNORMAL
FENTANYL UR QL: ABNORMAL
OPIATES UR QL SCN: ABNORMAL
PCP QUAL URINE (ROCHE): ABNORMAL

## 2024-04-03 PROCEDURE — 80307 DRUG TEST PRSMV CHEM ANLYZR: CPT

## 2024-04-03 PROCEDURE — G0480 DRUG TEST DEF 1-7 CLASSES: HCPCS | Mod: 90

## 2024-04-03 PROCEDURE — 99213 OFFICE O/P EST LOW 20 MIN: CPT | Performed by: FAMILY MEDICINE

## 2024-04-03 ASSESSMENT — PAIN SCALES - GENERAL: PAINLEVEL: NO PAIN (0)

## 2024-04-03 NOTE — PROGRESS NOTES
"  Assessment & Plan     Attention deficit hyperactivity disorder (ADHD), unspecified ADHD type  Refer to psych  - Adult Mental Health  Referral; Future    Accidental overdose of heroin, subsequent encounter  Refer to psych  - Adult Mental Health  Referral; Future            BMI  Estimated body mass index is 42.77 kg/m  as calculated from the following:    Height as of this encounter: 1.676 m (5' 6\").    Weight as of this encounter: 120.2 kg (265 lb).   Weight management plan: Discussed healthy diet and exercise guidelines          Camille Jacinto is a 34 year old, presenting for the following health issues:  Recheck Medication      4/3/2024     3:11 PM   Additional Questions   Roomed by neha     History of Present Illness       Reason for visit:  Med check    She eats 2-3 servings of fruits and vegetables daily.She consumes 2 sweetened beverage(s) daily.She exercises with enough effort to increase her heart rate 60 or more minutes per day.  She exercises with enough effort to increase her heart rate 6 days per week.   She is taking medications regularly.     Pt here for 6 month visit for adderall refill  Noted ER visit in February for accidental heroin overdose  Pt states she received pill from a friend's boyfriend and she thought it was a pain pill so she took it. She did not clarify exactly what she thought it was  Given use of illicit substance and a controlled substance agreement will not continue refilling her controlled substance. Would recommend pt to follow-up with psychiatry for further management of her ADHD or give clearance for further treatment if appropriate.             Review of Systems  Constitutional, HEENT, cardiovascular, pulmonary, gi and gu systems are negative, except as otherwise noted.      Objective    BP (!) 149/79   Pulse 87   Temp 98.7  F (37.1  C) (Oral)   Resp 15   Ht 1.676 m (5' 6\")   Wt 120.2 kg (265 lb)   LMP 03/16/2024   SpO2 99%   BMI 42.77 kg/m  "   Body mass index is 42.77 kg/m .  Physical Exam   GENERAL: alert and no distress    No results found for this or any previous visit (from the past 24 hour(s)).        Signed Electronically by: Katharine Montalvo MD

## 2024-04-04 ENCOUNTER — TELEPHONE (OUTPATIENT)
Dept: BEHAVIORAL HEALTH | Facility: CLINIC | Age: 34
End: 2024-04-04

## 2024-04-04 NOTE — TELEPHONE ENCOUNTER
"Pt is a(n) adult (18+ out of HS) Seeking as eval for Adult ALTAGRACIA Assessment for primary substance use treatment (OP ALTAGRACIA programs including Co-occurring).  Appointment scheduled by:  Patient.  (self-pay - complete Cost Estimate)  Brief reason for appt:  Per Pt, following up on referral by her provider for ALTAGRACIA Eval   needed?  NO  Contact information verified/updated: Yes    Jerry Arreaga    \"We have scheduled your evaluation. In the event that your insurance coverage comes back as out of network, you may receive a call to cancel your appointment and direct you to your insurance company for in-network coverage.\"    Disclaimer regarding insurance read to patient?  Yes    "

## 2024-04-09 LAB
FENTANYL UR-MCNC: <1 NG/ML
NORFENTANYL UR-MCNC: 5.1 NG/ML

## 2024-05-23 ENCOUNTER — TELEPHONE (OUTPATIENT)
Dept: FAMILY MEDICINE | Facility: CLINIC | Age: 34
End: 2024-05-23
Payer: COMMERCIAL

## 2024-05-23 NOTE — TELEPHONE ENCOUNTER
"  Reason for Call:  Appointment Request    Patient requesting this type of appt: Chronic Diease Management/Medication/Follow-Up    Requested provider: Katharine Montalvo    Reason patient unable to be scheduled: Not within requested timeframe    When does patient want to be seen/preferred time: 3-7 days    Comments: Pt did request a virtual telephone visit w/ pcp. However; July was the first option, pls call and advise. Pt would like to discuss medication and \"other\" stuff. Thanks.     Okay to leave a detailed message?: Yes at Cell number on file:    Telephone Information:   Mobile [phone]       Call taken on 5/23/2024 at 1:11 PM by Estee Padron  " Routine  care and anticipatory guidance

## 2024-05-28 NOTE — TELEPHONE ENCOUNTER
Her last visit with me was in 4/3/24 at which time I told her she needed to see psychiatry to help with management of her controlled substances.  I don't see any evidence of an appt in her chart  Has she scheduled with psych or seen them at an outside clinic? If yes, do we have the notes?  If no, then she needs to make an appt with them. If this is about an issue with non-controlled substance, please find out what this issue is so we can triage her appropriately    Katharine Montalvo MD

## 2024-05-29 NOTE — TELEPHONE ENCOUNTER
Please call pt to see her concerns. Per TCs notes she wants clarification of the recommendations of our last visit.   Because she was receiving a controlled substance from me and was see in the hospital due to heroin use, she has violated our agreement with the controlled substance agreement. Because of this I am no longer prescribing her adderall  She needs to follow-up with psychiatry and I placed the order at her last visit in August  What further questions does she have?    Katharine Montalvo MD

## 2024-05-29 NOTE — TELEPHONE ENCOUNTER
Called pt, relayed message from provider and provided number to psychiatry. Phone# [phone]. Pt verbalized that she will set up appointment. No further questions at this time.     Thank you - Oneyda Cannon, BSN, RN

## 2024-06-04 ENCOUNTER — MEDICAL CORRESPONDENCE (OUTPATIENT)
Dept: HEALTH INFORMATION MANAGEMENT | Facility: CLINIC | Age: 34
End: 2024-06-04
Payer: COMMERCIAL

## 2025-09-02 DIAGNOSIS — F11.20 UNCOMPLICATED OPIOID DEPENDENCE (H): Primary | ICD-10-CM
